# Patient Record
Sex: FEMALE | Race: WHITE | NOT HISPANIC OR LATINO | Employment: FULL TIME | URBAN - METROPOLITAN AREA
[De-identification: names, ages, dates, MRNs, and addresses within clinical notes are randomized per-mention and may not be internally consistent; named-entity substitution may affect disease eponyms.]

---

## 2017-01-09 ENCOUNTER — HOSPITAL ENCOUNTER (OUTPATIENT)
Dept: MRI IMAGING | Facility: CLINIC | Age: 47
Discharge: HOME | End: 2017-01-09
Payer: COMMERCIAL

## 2017-01-09 DIAGNOSIS — M19.90 OSTEOARTHRITIS, UNSPECIFIED OSTEOARTHRITIS TYPE, UNSPECIFIED SITE: ICD-10-CM

## 2017-01-09 PROCEDURE — 72141 MRI NECK SPINE W/O DYE: CPT

## 2017-08-11 ENCOUNTER — HEALTH MAINTENANCE LETTER (OUTPATIENT)
Age: 47
End: 2017-08-11

## 2017-08-14 ENCOUNTER — OFFICE VISIT (OUTPATIENT)
Dept: FAMILY MEDICINE | Facility: CLINIC | Age: 47
End: 2017-08-14

## 2017-08-14 VITALS
WEIGHT: 117.4 LBS | SYSTOLIC BLOOD PRESSURE: 124 MMHG | HEIGHT: 65 IN | BODY MASS INDEX: 19.56 KG/M2 | DIASTOLIC BLOOD PRESSURE: 86 MMHG | HEART RATE: 110 BPM | TEMPERATURE: 98.4 F

## 2017-08-14 DIAGNOSIS — F10.10 EXCESSIVE DRINKING ALCOHOL: ICD-10-CM

## 2017-08-14 DIAGNOSIS — M79.644 THUMB PAIN, RIGHT: Primary | ICD-10-CM

## 2017-08-14 DIAGNOSIS — Z79.1 LONG TERM (CURRENT) USE OF NON-STEROIDAL ANTI-INFLAMMATORIES (NSAID): ICD-10-CM

## 2017-08-14 DIAGNOSIS — Z87.891 PERSONAL HISTORY OF TOBACCO USE, PRESENTING HAZARDS TO HEALTH: ICD-10-CM

## 2017-08-14 DIAGNOSIS — R73.09 ELEVATED GLUCOSE: ICD-10-CM

## 2017-08-14 PROCEDURE — 80053 COMPREHEN METABOLIC PANEL: CPT | Mod: 90 | Performed by: PHYSICIAN ASSISTANT

## 2017-08-14 PROCEDURE — 73130 X-RAY EXAM OF HAND: CPT | Mod: RT | Performed by: PHYSICIAN ASSISTANT

## 2017-08-14 PROCEDURE — 36415 COLL VENOUS BLD VENIPUNCTURE: CPT | Performed by: PHYSICIAN ASSISTANT

## 2017-08-14 PROCEDURE — 83036 HEMOGLOBIN GLYCOSYLATED A1C: CPT | Performed by: PHYSICIAN ASSISTANT

## 2017-08-14 PROCEDURE — 99213 OFFICE O/P EST LOW 20 MIN: CPT | Performed by: PHYSICIAN ASSISTANT

## 2017-08-14 RX ORDER — ESCITALOPRAM OXALATE 5 MG/1
5 TABLET ORAL DAILY
COMMUNITY

## 2017-08-14 NOTE — LETTER
Susie Santana  [address] [facility] [address]        August 15, 2017          Dear ,    We are writing to inform you of your test results.    Your test results fall within the expected range(s) or remain unchanged from previous results.      Your glucose was slightly high so I did a test called an A1C to see what your average blood sugar for the last 3 months was at this was normal.   No evidence of diabetes.    The xray did show some arthritis.  Please try the Tylenol we discussed, see Dr. Rosen if your symptoms don't improve.    Resulted Orders   Comprehensive metabolic panel   Result Value Ref Range    Glucose 161 (H) 65 - 99 mg/dL      Comment:                    Fasting reference interval     For someone without known diabetes, a glucose  value >125 mg/dL indicates that they may have  diabetes and this should be confirmed with a  follow-up test.         Urea Nitrogen 13 7 - 25 mg/dL    Creatinine 0.78 0.50 - 1.10 mg/dL    GFR Estimate 90 > OR = 60 mL/min/1.73m2    EGFR African American 104 > OR = 60 mL/min/1.73m2    BUN/Creatinine Ratio NOT APPLICABLE 6 - 22 (calc)    Sodium 137 135 - 146 mmol/L    Potassium 4.9 3.5 - 5.3 mmol/L    Chloride 102 98 - 110 mmol/L    Carbon Dioxide 23 20 - 31 mmol/L    Calcium 10.2 8.6 - 10.2 mg/dL    Protein Total 7.4 6.1 - 8.1 g/dL    Albumin 5.0 3.6 - 5.1 g/dL    Globulin Calculated 2.4 1.9 - 3.7 g/dL (calc)    A/G Ratio 2.1 1.0 - 2.5 (calc)    Bilirubin Total 0.5 0.2 - 1.2 mg/dL    Alkaline Phosphatase 76 33 - 115 U/L    AST 23 10 - 35 U/L    ALT 22 6 - 29 U/L   Hemoglobin A1c (BFP)   Result Value Ref Range    Hemoglobin A1C 4.9 4.0 - 6.0 %     Xray report:    Req. Phys: Zoey Evans PA Clinic MRN:  Clinic: [facility] Acc#: [id number]  Exam: HAND 3 VIEWS / RT Exam Date: 08/14/2017  HAND 3 VIEWS RIGHT 8/14/2017 2:00 PM  HISTORY: Right thumb pain.  COMPARISON: None.  IMPRESSION: There is some minimal degenerative change in  the first metacarpophalangeal joint in the first  interphalangeal joint with a small stella of calcification or ossification involving the interphalangeal joint which is  probably due to some old trauma rather than acute fracture. No evidence for any definite acute fractures or any other  abnormalities.      If you have any questions or concerns, please call the clinic at the number listed above.       Sincerely,        WILLIAN Benavidez

## 2017-08-14 NOTE — NURSING NOTE
Susie is here for a consult for Arthritis.     Pre-Visit Screening :  Immunizations : up to date    Colonoscopy : NA  Mammogram : is up to date  Asthma Action Test/Plan : NA  PHQ9/GAD7 :  utd      Pulse - regular  My Chart - accepts    CLASSIFICATION OF OVERWEIGHT AND OBESITY BY BMI                         Obesity Class           BMI(kg/m2)  Underweight                                    < 18.5  Normal                                         18.5-24.9  Overweight                                     25.0-29.9  OBESITY                     I                  30.0-34.9                              II                 35.0-39.9  EXTREME OBESITY             III                >40                             Patient's  BMI Body mass index is 19.54 kg/(m^2).  http://hin.nhlbi.nih.gov/menuplanner/menu.cgi  Questioned patient about current smoking habits.  Pt. currently smokes.  Advised about smoking cessation.    LILLIE Sigala ([facility])

## 2017-08-14 NOTE — MR AVS SNAPSHOT
After Visit Summary   8/14/2017    Susie Santana    MRN: [id number]           Patient Information     Date Of Birth          1970        Visit Information        Provider Department      8/14/2017 1:00 PM Zoey Evans PA [facility]        Today's Diagnoses     Thumb pain, right    -  1    Excessive drinking alcohol        Long term (current) use of non-steroidal anti-inflammatories (nsaid)        Personal history of tobacco use, presenting hazards to health           Follow-ups after your visit        Additional Services     ORTHOPEDICS ADULT REFERRAL       Your provider has referred you to: [facility] [place] [phone]   [email or web address]    Please be aware that coverage of these services is subject to the terms and limitations of your health insurance plan.  Call member services at your health plan with any benefit or coverage questions.      Please bring the following to your appointment:    >>   Any x-rays, CTs or MRIs which have been performed.  Contact the facility where they were done to arrange for  prior to your scheduled appointment.  Any new CT, MRI or other procedures ordered by your specialist must be performed at a [facility] facility or coordinated by your clinic's referral office.    >>   List of current medications   >>   This referral request   >>   Any documents/labs given to you for this referral                  Who to contact     If you have questions or need follow up information about today's clinic visit or your schedule please contact [facility] directly at [phone].  Normal or non-critical lab and imaging results will be communicated to you by MyChart, letter or phone within 4 business days after the clinic has received the results. If you do not hear from us within 7 days, please contact the clinic through MyChart or phone. If you have a critical or  "abnormal lab result, we will notify you by phone as soon as possible.  Submit refill requests through OrderMotion or call your pharmacy and they will forward the refill request to us. Please allow 3 business days for your refill to be completed.          Additional Information About Your Visit        MyChart Information     OrderMotion lets you send messages to your doctor, view your test results, renew your prescriptions, schedule appointments and more. To sign up, go to [email or web address] . Click on \"Log in\" on the left side of the screen, which will take you to the Welcome page. Then click on \"Sign up Now\" on the right side of the page.     You will be asked to enter the access code listed below, as well as some personal information. Please follow the directions to create your username and password.     Your access code is: [email or web address]  Expires: 2017  1:42 PM     Your access code will  in 90 days. If you need help or a new code, please call your [facility] clinic or [phone].        Care EveryWhere ID     This is your Care EveryWhere ID. This could be used by other organizations to access your [facility] medical records  [id number]        Your Vitals Were     Pulse Temperature Height Breastfeeding? BMI (Body Mass Index)       110 98.4  F (36.9  C) (Oral) 1.651 m (5' 5\") No 19.54 kg/m2        Blood Pressure from Last 3 Encounters:   17 124/86   13 134/80   13 130/82    Weight from Last 3 Encounters:   17 53.3 kg (117 lb 6.4 oz)   13 54.4 kg (120 lb)   13 54.6 kg (120 lb 6.4 oz)              We Performed the Following     Comprehensive metabolic panel     ORTHOPEDICS ADULT REFERRAL     VENOUS COLLECTION     X-ray rt Hand G/E 3 vws*        Primary Care Provider Office Phone # Fax WILLIAN Persaud [phone] [address]  [facility] [address]        Equal Access to Services     MILES LUNSFORD AH: Christopher Dejesus, " wachristineda luroseanneadaha, qaybta kaamor flaherty, briseyda parkeraan ah. So [facility] [phone].    ATENCIÓN: Si dru nguyễn, tiene a galindo disposición servicios gratuitos de asistencia lingüística. Llame al [phone].    We comply with applicable federal civil rights laws and [place] laws. We do not discriminate on the basis of race, color, national origin, age, disability sex, sexual orientation or gender identity.            Thank you!     Thank you for choosing [facility]  for your care. Our goal is always to provide you with excellent care. Hearing back from our patients is one way we can continue to improve our services. Please take a few minutes to complete the written survey that you may receive in the mail after your visit with us. Thank you!             Your Updated Medication List - Protect others around you: Learn how to safely use, store and throw away your medicines at www.disposemymeds.org.          This list is accurate as of: 8/14/17  1:42 PM.  Always use your most recent med list.                   Brand Name Dispense Instructions for use Diagnosis    ALPRAZolam 0.25 MG tablet    XANAX    10 tablet    Take 1 tablet (0.25 mg) by mouth as needed for anxiety    Anxiety state, unspecified       LEXAPRO 5 MG tablet   Generic drug:  escitalopram      Take 5 mg by mouth daily        propranolol 60 MG 24 hr capsule    INDERAL LA    30 capsule    Take 1 capsule by mouth daily.    Anxiety state, unspecified

## 2017-08-14 NOTE — PROGRESS NOTES
SUBJECTIVE:                                                    Susie Santana is a 46 year old female who presents to clinic today for the following health issues:    Here with concerns about pain in her thumbs.  Pain increasing at MCP joint bilatearlly in the last year. Pt works as hair dresses. Has noticed that with picking up items, increasingly painful if using thumb, karla. When doing foiling of hair.      R= L    Pt is right handed.  Family hx: mother with knee replacement  Pt has bone spurs in her cervical spine.      OTC: Nothing    Smokin/2 ppd    Alcohol avg weekly 14 -  light beer.             Labs reviewed in EPIC  BP Readings from Last 3 Encounters:   17 124/86   13 134/80   13 130/82    Wt Readings from Last 3 Encounters:   17 53.3 kg (117 lb 6.4 oz)   13 54.4 kg (120 lb)   13 54.6 kg (120 lb 6.4 oz)                  Patient Active Problem List   Diagnosis     Allergic rhinitis     Family history of malignant neoplasm of gastrointestinal tract     Personal history of tobacco use, presenting hazards to health     Ganglion     Polyp of nasal cavity     Chondromalacia of patella     Health Care Home     Past Surgical History:   Procedure Laterality Date     C LIGATE FALLOPIAN TUBE,POSTPARTUM      Tubal Ligation     HC TOOTH EXTRACTION W/FORCEP         Social History   Substance Use Topics     Smoking status: Current Every Day Smoker     Packs/day: 0.50     Years: 14.00     Smokeless tobacco: Never Used     Alcohol use 8.0 oz/week     Family History   Problem Relation Age of Onset     Alzheimer Disease Paternal Grandmother      Alzheimer Disease Paternal Grandfather      Alzheimer Disease Maternal Grandmother      CANCER Father      colon     CANCER Paternal Grandmother      colon     DIABETES Maternal Grandfather      HEART DISEASE No family hx of      Breast Cancer No family hx of      Psychotic Disorder Sister      anxiety         Current Outpatient  "Prescriptions   Medication Sig Dispense Refill     escitalopram (LEXAPRO) 5 MG tablet Take 5 mg by mouth daily       ALPRAZolam (XANAX) 0.25 MG tablet Take 1 tablet (0.25 mg) by mouth as needed for anxiety 10 tablet 0     propranolol (INDERAL LA) 60 MG capsule Take 1 capsule by mouth daily. (Patient not taking: Reported on 8/14/2017) 30 capsule 1     Allergies   Allergen Reactions     Cephalosporins Hives     Dogs Itching     Latex      palpitations;     Sulfa Drugs Hives     Recent Labs   Lab Test  07/08/13   1447   TSH  2.09              OBJECTIVE:   /86 (BP Location: Left arm, Patient Position: Chair, Cuff Size: Adult Regular)  Pulse 110  Temp 98.4  F (36.9  C) (Oral)  Ht 1.651 m (5' 5\")  Wt 53.3 kg (117 lb 6.4 oz)  Breastfeeding? No  BMI 19.54 kg/m2   Body mass index is 19.54 kg/(m^2).       Bilateral  Inspection: Heberden nodules on 2nd and 3rd fingers  Tender: Thumb:   MCP joint  Non-tender: Carpals:  scaphoid, lunate, capitate, trapezium, trapezoid, hamate, triquetrum, Metacarpals:  1st metacarpal, 2nd metacarpal, 3rd metacarpal, 4th metacarpal, 5th metacarpal  Range of Motion All Normal  Strength: full strength  Special tests: grind test of both thumbs + for pain at MCP joint    Xray - very minimal arthritis            ASSESSMENT/PLAN:                                                        ICD-10-CM    1. Thumb pain, right M79.644 X-ray rt Hand G/E 3 vws*   2. Excessive drinking alcohol F10.10 VENOUS COLLECTION     Comprehensive metabolic panel     ORTHOPEDICS ADULT REFERRAL     CANCELED: HEPATIC PANEL ([facility])   3. Long term (current) use of non-steroidal anti-inflammatories (nsaid) Z79.1 Comprehensive metabolic panel     ORTHOPEDICS ADULT REFERRAL   4. Personal history of tobacco use, presenting hazards to health Z87.891        Start APAP 1000 mg tid   Liver and Cr. Pending  Stop smoking  Ortho if thumb pain not improving.   Limit alcohol intake      [facility]

## 2017-08-15 LAB
ALBUMIN SERPL-MCNC: 5 G/DL (ref 3.6–5.1)
ALBUMIN/GLOB SERPL: 2.1 (CALC) (ref 1–2.5)
ALP SERPL-CCNC: 76 U/L (ref 33–115)
ALT SERPL-CCNC: 22 U/L (ref 6–29)
AST SERPL-CCNC: 23 U/L (ref 10–35)
BILIRUB SERPL-MCNC: 0.5 MG/DL (ref 0.2–1.2)
BUN SERPL-MCNC: 13 MG/DL (ref 7–25)
BUN/CREATININE RATIO: ABNORMAL (CALC) (ref 6–22)
CALCIUM SERPL-MCNC: 10.2 MG/DL (ref 8.6–10.2)
CHLORIDE SERPLBLD-SCNC: 102 MMOL/L (ref 98–110)
CO2 SERPL-SCNC: 23 MMOL/L (ref 20–31)
CREAT SERPL-MCNC: 0.78 MG/DL (ref 0.5–1.1)
EGFR AFRICAN AMERICAN - QUEST: 104 ML/MIN/1.73M2
GFR SERPL CREATININE-BSD FRML MDRD: 90 ML/MIN/1.73M2
GLOBULIN, CALCULATED - QUEST: 2.4 G/DL (CALC) (ref 1.9–3.7)
GLUCOSE - QUEST: 161 MG/DL (ref 65–99)
HBA1C MFR BLD: 4.9 % (ref 4–6)
POTASSIUM SERPL-SCNC: 4.9 MMOL/L (ref 3.5–5.3)
PROT SERPL-MCNC: 7.4 G/DL (ref 6.1–8.1)
SODIUM SERPL-SCNC: 137 MMOL/L (ref 135–146)

## 2017-10-26 ENCOUNTER — HOSPITAL ENCOUNTER (OUTPATIENT)
Dept: MAMMOGRAPHY | Facility: CLINIC | Age: 47
Discharge: HOME | End: 2017-10-26
Attending: OBSTETRICS & GYNECOLOGY | Admitting: OBSTETRICS & GYNECOLOGY
Payer: COMMERCIAL

## 2017-10-26 DIAGNOSIS — Z12.31 VISIT FOR SCREENING MAMMOGRAM: ICD-10-CM

## 2017-10-26 PROCEDURE — 77063 BREAST TOMOSYNTHESIS BI: CPT

## 2017-10-26 PROCEDURE — G0202 SCR MAMMO BI INCL CAD: HCPCS

## 2017-12-21 ENCOUNTER — OFFICE VISIT (OUTPATIENT)
Dept: FAMILY MEDICINE | Facility: CLINIC | Age: 47
End: 2017-12-21

## 2017-12-21 VITALS
SYSTOLIC BLOOD PRESSURE: 124 MMHG | OXYGEN SATURATION: 98 % | HEART RATE: 94 BPM | BODY MASS INDEX: 19.86 KG/M2 | TEMPERATURE: 98.5 F | HEIGHT: 65 IN | WEIGHT: 119.2 LBS | DIASTOLIC BLOOD PRESSURE: 84 MMHG

## 2017-12-21 DIAGNOSIS — Z23 NEED FOR VACCINATION: ICD-10-CM

## 2017-12-21 DIAGNOSIS — Z71.89 ACP (ADVANCE CARE PLANNING): ICD-10-CM

## 2017-12-21 DIAGNOSIS — Z87.891 PERSONAL HISTORY OF TOBACCO USE, PRESENTING HAZARDS TO HEALTH: ICD-10-CM

## 2017-12-21 DIAGNOSIS — L08.9 SKIN INFECTION: Primary | ICD-10-CM

## 2017-12-21 LAB
% GRANULOCYTES: 64.5 %
HCT VFR BLD AUTO: 45 % (ref 35–47)
HEMOGLOBIN: 14.4 G/DL (ref 11.7–15.7)
LYMPHOCYTES NFR BLD AUTO: 26.9 %
MCH RBC QN AUTO: 33.3 PG (ref 26–33)
MCHC RBC AUTO-ENTMCNC: 32 G/DL (ref 31–36)
MCV RBC AUTO: 104.2 FL (ref 78–100)
MONOCYTES NFR BLD AUTO: 8.6 %
PLATELET COUNT - QUEST: 362 10^9/L (ref 150–375)
RBC # BLD AUTO: 4.32 10*12/L (ref 3.8–5.2)
WBC # BLD AUTO: 9.1 10*9/L (ref 4–11)

## 2017-12-21 PROCEDURE — 90686 IIV4 VACC NO PRSV 0.5 ML IM: CPT | Performed by: PHYSICIAN ASSISTANT

## 2017-12-21 PROCEDURE — 90471 IMMUNIZATION ADMIN: CPT | Performed by: PHYSICIAN ASSISTANT

## 2017-12-21 PROCEDURE — 85025 COMPLETE CBC W/AUTO DIFF WBC: CPT | Performed by: PHYSICIAN ASSISTANT

## 2017-12-21 PROCEDURE — 99213 OFFICE O/P EST LOW 20 MIN: CPT | Mod: 25 | Performed by: PHYSICIAN ASSISTANT

## 2017-12-21 PROCEDURE — 36415 COLL VENOUS BLD VENIPUNCTURE: CPT | Performed by: PHYSICIAN ASSISTANT

## 2017-12-21 RX ORDER — DOXYCYCLINE 100 MG/1
100 CAPSULE ORAL 2 TIMES DAILY
Qty: 20 CAPSULE | Refills: 0 | Status: SHIPPED | OUTPATIENT
Start: 2017-12-21 | End: 2020-07-20

## 2017-12-21 NOTE — PROGRESS NOTES
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   Pain/swelling continues in finger.  Pt is right handed  Denies fevers, trauma, flu-like sx    Is a smoker, not ready to quit            BP Readings from Last 3 Encounters:   12/21/17 124/84   08/14/17 124/86   09/23/13 134/80    Wt Readings from Last 3 Encounters:   12/21/17 54.1 kg (119 lb 3.2 oz)   08/14/17 53.3 kg (117 lb 6.4 oz)   09/23/13 54.4 kg (120 lb)            Patient Active Problem List   Diagnosis     Allergic rhinitis     Family history of malignant neoplasm of gastrointestinal tract     Personal history of tobacco use, presenting hazards to health     Ganglion     Polyp of nasal cavity     Chondromalacia of patella     Health Care Home     ACP (advance care planning)     Past Surgical History:   Procedure Laterality Date     C LIGATE FALLOPIAN TUBE,POSTPARTUM  2005    Tubal Ligation     HC TOOTH EXTRACTION W/FORCEP         Social History   Substance Use Topics     Smoking status: Current Every Day Smoker     Packs/day: 0.50     Years: 14.00     Smokeless tobacco: Never Used     Alcohol use 8.0 oz/week     Family History   Problem Relation Age of Onset     Alzheimer Disease Paternal Grandmother      Alzheimer Disease Paternal Grandfather      Alzheimer Disease Maternal Grandmother      CANCER Father      colon     CANCER Paternal Grandmother      colon     DIABETES Maternal Grandfather      HEART DISEASE No family hx of      Breast Cancer No family hx of      Psychotic Disorder Sister      anxiety         Current Outpatient Prescriptions   Medication Sig Dispense Refill     escitalopram (LEXAPRO) 5 MG tablet Take 5 mg by mouth daily       ALPRAZolam (XANAX) 0.25 MG tablet Take 1 tablet (0.25 mg) by mouth  "as needed for anxiety 10 tablet 0       Allergies   Allergen Reactions     Cephalosporins Hives     Dogs Itching     Latex      palpitations;     Sulfa Drugs Hives       OBJECTIVE:                                                    /84 (BP Location: Left arm, Patient Position: Chair, Cuff Size: Adult Regular)  Pulse 94  Temp 98.5  F (36.9  C) (Oral)  Ht 1.651 m (5' 5\")  Wt 54.1 kg (119 lb 3.2 oz)  LMP  (LMP Unknown)  SpO2 98%  Breastfeeding? No  BMI 19.84 kg/m2 Body mass index is 19.84 kg/(m^2).     GENERAL: alert and no distress  Right thumb: distal  thumb moderately swollen, warm, no obvious source of opening to the skin. Mild decrease in flexion of thumb    Labs Resulted Today:   Results for orders placed or performed in visit on 12/21/17   CL AFF HEMOGRAM/PLATE/DIFF (BFP)   Result Value Ref Range    WBC 9.1 4.0 - 11 10*9/L    RBC Count 4.32 3.8 - 5.2 10*12/L    Hemoglobin 14.4 11.7 - 15.7 g/dL    Hematocrit 45.0 35.0 - 47.0 %    .2 (A) 78 - 100 fL    MCH 33.3 (A) 26 - 33 pg    MCHC 32.0 31 - 36 g/dL    Platelet Count 362 150 - 375 10^9/L    % Granulocytes 64.5 %    % Lymphocytes 26.9 %    % Monocytes 8.6 %            ASSESSMENT/PLAN:                                                      1. ACP (advance care planning)      2. Need for vaccination    - HC FLU VAC PRESRV FREE QUAD SPLIT VIR 3+YRS IM  - VACCINE ADMINISTRATION, INITIAL    3. Skin infection  FU with Dr. Bustamante 2 days recheck  RTC tomorrow or UC if worsening  - CL AFF HEMOGRAM/PLATE/DIFF (BFP)  - VENOUS COLLECTION  - doxycycline (VIBRAMYCIN) 100 MG capsule; Take 1 capsule (100 mg) by mouth 2 times daily  Dispense: 20 capsule; Refill: 0    4. Personal history of tobacco use, presenting hazards to health  Not ready to quit        [facility]    "

## 2017-12-21 NOTE — NURSING NOTE
"Susie Santana is here today for an infection in her right thumb.    Pre-visit Planning:    Immunizations -up to date - Flu Shot Today  Colonoscopy -NA  Mammogram -is up to date  Asthma test --NA  PHQ9 -up to date  DAVID 7 -is up to date      Vitals:    BP Cuff left  Arm with regular Cuff  PULSE regular  119 lbs 3.2 oz and 5' 5\"  CLASSIFICATION OF OVERWEIGHT AND OBESITY BY BMI                        Obesity Class           BMI(kg/m2)  Underweight                                    < 18.5  Normal                                         18.5-24.9  Overweight                                     25.0-29.9  OBESITY                     I                  30.0-34.9                             II                 35.0-39.9  EXTREME OBESITY             III                >40      Patient's  BMI Body mass index is 19.84 kg/(m^2).  Http://hin.nhlbi.nih.gov/menuplanner/menu.cgi    My Chart: - declines     Tobacco Use: Questioned patient about current smoking habits.  Pt. currently smokes.  Advised about smoking cessation.    The patient has verbalized that it is ok to leave a detailed voice message on the patient's cell phone with results/recommendations from this visit.     Verified Phone Number Today: [phone]    Monet Sanchez [facility]    "

## 2017-12-21 NOTE — MR AVS SNAPSHOT
"              After Visit Summary   12/21/2017    Susie Santana    MRN: [id number]           Patient Information     Date Of Birth          1970        Visit Information        Provider Department      12/21/2017 3:30 PM Zoey Evans PA [facility]        Today's Diagnoses     Skin infection    -  1    ACP (advance care planning)        Need for vaccination        Personal history of tobacco use, presenting hazards to health           Follow-ups after your visit        Who to contact     If you have questions or need follow up information about today's clinic visit or your schedule please contact [facility] directly at [phone].  Normal or non-critical lab and imaging results will be communicated to you by MyChart, letter or phone within 4 business days after the clinic has received the results. If you do not hear from us within 7 days, please contact the clinic through MyChart or phone. If you have a critical or abnormal lab result, we will notify you by phone as soon as possible.  Submit refill requests through [employer] or call your pharmacy and they will forward the refill request to us. Please allow 3 business days for your refill to be completed.          Additional Information About Your Visit        Care EveryWhere ID     This is your Care EveryWhere ID. This could be used by other organizations to access your [facility] medical records  [id number]        Your Vitals Were     Pulse Temperature Height Last Period Pulse Oximetry Breastfeeding?    94 98.5  F (36.9  C) (Oral) 1.651 m (5' 5\") (LMP Unknown) 98% No    BMI (Body Mass Index)                   19.84 kg/m2            Blood Pressure from Last 3 Encounters:   12/21/17 124/84   08/14/17 124/86   09/23/13 134/80    Weight from Last 3 Encounters:   12/21/17 54.1 kg (119 lb 3.2 oz)   08/14/17 53.3 kg (117 lb 6.4 oz)   09/23/13 54.4 kg (120 lb)              We Performed the Following     " CL AFF HEMOGRAM/PLATE/DIFF (BFP)     HC FLU VAC PRESRV FREE QUAD SPLIT VIR 3+YRS IM     VACCINE ADMINISTRATION, INITIAL     VENOUS COLLECTION          Today's Medication Changes          These changes are accurate as of: 12/21/17 11:59 PM.  If you have any questions, ask your nurse or doctor.               Start taking these medicines.        Dose/Directions    doxycycline 100 MG capsule   Commonly known as:  VIBRAMYCIN   Used for:  Skin infection   Started by:  Zoey Evans PA        Dose:  100 mg   Take 1 capsule (100 mg) by mouth 2 times daily   Quantity:  20 capsule   Refills:  0            Where to get your medicines      [facility] [address] [place] [address]  [id number] [place] [id number]    Hours:  24-hours Phone:  [phone]     doxycycline 100 MG capsule                Primary Care Provider Office Phone # Fax #    WILLIAN Benavidez [phone] [address]  [facility] [address]        Equal Access to Services     [facility]: Hadii aad ku hadasho Soomaali, waaxda luqadaha, qaybta kaalmada adeegyada, waxay christi qureshi . So [facility] [phone].    ATENCIÓN: Si habla español, tiene a galindo disposición servicios gratuitos de asistencia lingüística. Llame al [phone].    We comply with applicable federal civil rights laws and [place] laws. We do not discriminate on the basis of race, color, national origin, age, disability, sex, sexual orientation, or gender identity.            Thank you!     Thank you for choosing [facility]  for your care. Our goal is always to provide you with excellent care. Hearing back from our patients is one way we can continue to improve our services. Please take a few minutes to complete the written survey that you may receive in the mail after your visit with us. Thank you!              Your Updated Medication List - Protect others around you: Learn how to safely use, store and throw away your medicines at www.disposemymeds.org.          This list is accurate as of: 12/21/17 11:59 PM.  Always use your most recent med list.                   Brand Name Dispense Instructions for use Diagnosis    ALPRAZolam 0.25 MG tablet    XANAX    10 tablet    Take 1 tablet (0.25 mg) by mouth as needed for anxiety    Anxiety state, unspecified       doxycycline 100 MG capsule    VIBRAMYCIN    20 capsule    Take 1 capsule (100 mg) by mouth 2 times daily    Skin infection       LEXAPRO 5 MG tablet   Generic drug:  escitalopram      Take 5 mg by mouth daily

## 2019-03-04 ENCOUNTER — HOSPITAL ENCOUNTER (OUTPATIENT)
Dept: MAMMOGRAPHY | Facility: CLINIC | Age: 49
Discharge: HOME | End: 2019-03-04
Attending: PHYSICIAN ASSISTANT | Admitting: PHYSICIAN ASSISTANT
Payer: COMMERCIAL

## 2019-03-04 DIAGNOSIS — Z12.31 VISIT FOR SCREENING MAMMOGRAM: ICD-10-CM

## 2019-03-04 PROCEDURE — 77063 BREAST TOMOSYNTHESIS BI: CPT

## 2019-10-03 ENCOUNTER — ALLIED HEALTH/NURSE VISIT (OUTPATIENT)
Dept: FAMILY MEDICINE | Facility: CLINIC | Age: 49
End: 2019-10-03

## 2019-10-03 DIAGNOSIS — Z23 NEED FOR VACCINATION: Primary | ICD-10-CM

## 2019-10-03 PROCEDURE — 90686 IIV4 VACC NO PRSV 0.5 ML IM: CPT | Performed by: PHYSICIAN ASSISTANT

## 2019-10-03 PROCEDURE — 90471 IMMUNIZATION ADMIN: CPT | Performed by: PHYSICIAN ASSISTANT

## 2020-03-16 ENCOUNTER — VIRTUAL VISIT (OUTPATIENT)
Dept: FAMILY MEDICINE | Facility: OTHER | Age: 50
End: 2020-03-16

## 2020-03-16 NOTE — PROGRESS NOTES
"Date: 2020 11:05:32  Clinician: Isrrael Rowell  Clinician NPI: [id number]  Patient: Susie Santana  Patient : 1970  Patient Address: [address] [place] [id number]  Patient Phone: [phone]  Visit Protocol: URI  Patient Summary:  Susie is a 49 year old ( : 1970 ) female who initiated a Visit for COVID-19 (Coronavirus) evaluation and screening. When asked the question \"Please sign me up to receive news, health information and promotions. \", Susie responded \"No\".    Susie states her symptoms started 1-2 days ago.   Her symptoms consist of malaise, rhinitis, chills, a sore throat, and a cough.   Symptom details     Nasal secretions: The color of her mucus is clear.    Cough: Susie coughs a few times an hour and her cough is not more bothersome at night. Phlegm comes into her throat when she coughs. She believes her cough is caused by post-nasal drip. The color of the phlegm is clear.     Sore throat: Susie reports having mild throat pain (1-3 on a 10 point pain scale), does not have exudate on her tonsils, and can swallow liquids. She is not sure if the lymph nodes in her neck are enlarged. A rash has not appeared on the skin since the sore throat started.      Susie denies having ear pain, headache, facial pain or pressure, myalgias, fever, wheezing, nasal congestion, and teeth pain. She also denies having recent facial or sinus surgery in the past 60 days and taking antibiotic medication for the symptoms. She is not experiencing dyspnea.   Precipitating events  Within the past week, Susie has not been exposed to someone with strep throat. She has not recently been exposed to someone with influenza. Susie has been in close contact with the following high risk individuals: people with asthma, heart disease or diabetes and adults 65 or older.   Pertinent COVID-19 (Coronavirus) information  Susie has not traveled internationally or to the areas where COVID-19 (Coronavirus) is " widespread in the last 14 days before the start of her symptoms.   Susie has not had close contact with a suspected or laboratory-confirmed COVID-19 patient within 14 days of symptom onset.   Susie is not a healthcare worker and does not work in a healthcare facility.   Pertinent medical history  Susie typically gets a yeast infection when she takes antibiotics. She has used fluconazole (Diflucan) to treat previous yeast infections. 1 dose of fluconazole (Diflucan) has typically been sufficient for symptoms to resolve in the past.   Susie does not need a return to work/school note.   Weight: 120 lbs   Susie smokes or uses smokeless tobacco.   Additional information as reported by the patient (free text): I had a low grade fever on Saturday and Sunday, today the fever is gone   Weight: 120 lbs    MEDICATIONS: Flonase Allergy Relief nasal, ALLERGIES: Ceftin, Sulfa (Sulfonamide Antibiotics), Latex, Natural Rubber  Clinician Response:  Dear Susie,  Based on the information provided, you have a viral upper respiratory infection, otherwise known as a cold. Symptoms vary from person to person, but can include sneezing, coughing, a runny nose, sore throat, and headache and range from mild to severe.  Unfortunately, there are no medications that can cure a cold, so treatment is focused on controlling symptoms as much as possible. Most people gradually feel better until symptoms are gone in 1-2 weeks.  Medication information  Because you have a viral infection, antibiotics will not help you get better. Treating a viral infection with antibiotics could actually make you feel worse.  Self care  The following tips will keep you as comfortable as possible while you recover:     Rest    Drink plenty of water and other liquids    Take a hot shower to loosen congestion    Use throat lozenges    Gargle with warm salt water (1/4 teaspoon of salt per 8 ounce glass of water)    Suck on frozen items such as popsicles or ice cubes     Drink hot tea with lemon and honey    Take a spoonful of honey to reduce your cough     Also, as your provider, I need you to know that becoming tobacco-free is the most important thing you can do to protect your current and future health.  When to seek care  Please be seen in a clinic or urgent care if new symptoms develop, or symptoms become worse.  Call 911 or go to the emergency room if you feel that your throat is closing off, you suddenly develop a rash, you are unable to swallow fluids, you are drooling, or you are having difficulty breathing.  COVID-19 (Coronavirus) General Information  With the increase in the number of COVID-19 (Coronavirus) cases, we understand you may have some questions. Below is some helpful information on COVID-19 (Coronavirus).  How can I protect myself and others from the COVID-19 (Coronavirus)?  Because there is currently no vaccine to prevent infection, the best way to protect yourself is to avoid being exposed to this virus. Put distance between yourself and other people if COVID-19 (Coronavirus) is spreading in your community. The virus is thought to spread mainly from person-to-person.     Between people who are in close contact with one another (within about 6 about) for prolonged period (10 minutes or longer).    Through respiratory droplets produced when an infected person coughs or sneezes.     The CDC recommends the following additional steps to protect yourself and others:     Wash your hands often with soap and water for at least 20 seconds, especially after blowing your nose, coughing, or sneezing; going to the bathroom; and before eating or preparing food.  Use an alcohol-based hand  that contains at least 60 percent alcohol if soap and water are not available.        Avoid touching your eyes, nose and mouth with unwashed hands.    Avoid close contact with people who are sick.    Stay home when you are sick.    Cover your cough or sneeze with a tissue, then  throw the tissue in the trash.    Clean and disinfect frequently touched objects and surfaces.     You can help stop COVID-19 (Coronavirus) by knowing the signs and symptoms:     Fever    Cough    Shortness of breath     Contact your healthcare provider if   Develop symptoms   AND   Have been in close contact with a person known to have COVID-19 (Coronavirus) or live in or have recently traveled from an area with ongoing spread of COVID-19 (Coronavirus). Call ahead before you go to a doctor's office or emergency room. Tell them about your recent travel and your symptoms.   For the most up to date information, visit the CDC's website.  Steps to help prevent the spread of COVID-19 (Coronavirus) if you are sick  If you are sick with COVID-19 (Coronavirus) or suspect you are infected with the virus that causes COVID-19 (Coronavirus), follow the steps below to help prevent the disease from spreading&nbsp;to people in your home and community.     Stay home except to get medical care. Home isolation may be started in consultation with your healthcare clinician.    Separate yourself from other people and animals in your home.    Call ahead before visiting your doctor if you have a medical appointment.    Wear a facemask when you are around other people.    Cover your cough and sneezes.    Clean your hands often.    Avoid sharing personal household items.    Clean and disinfect frequently touched objects and surfaces everyday.    You will need to have someone drop off medications or household supplies (if needed) at your house without coming inside or in contact with you or others living in your house.    Monitor your symptoms and seek prompt medical care if your illness is worsening (e.g. Difficulty breathing).    Discontinue home isolation only in consultation with your healthcare provider.     For more detailed and up to date information on what to do if you are sick, visit this link: What to Do If You Are Sick With  "Coronavirus Disease 2019 (COVID-19).  Do I need to be tested for COVID-19 (Coronavirus)?     At this time, the limited number of tests available are controlled by the state and local health departments and are being reserved for more seriously ill patients, those with known exposure to confirmed patients, and those with recent travel (within 14 days) to countries with high rates of COVID-19 (Coronavirus).    Decisions on which patients receive testing will be based on the local spread of COVID-19 (Coronavirus) as well as the symptoms. Your healthcare provider will make the final decision on whether you should be tested.    In the meantime, if you have concerns that you may have been exposed, it is reasonable to practice \"social distancing.\"&nbsp; If you are ill with a cold or flu-like illness, please monitor your symptoms and reach out to your healthcare provider if your symptoms worsen.    For more up to date information, visit this link: COVID-19 (Coronavirus) Frequently Asked Questions and Answers.      Diagnosis: Viral URI  Diagnosis ICD: J06.9  "

## 2020-06-25 ENCOUNTER — HOSPITAL ENCOUNTER (OUTPATIENT)
Dept: MAMMOGRAPHY | Facility: CLINIC | Age: 50
Discharge: HOME | End: 2020-06-25
Attending: OBSTETRICS & GYNECOLOGY | Admitting: OBSTETRICS & GYNECOLOGY
Payer: COMMERCIAL

## 2020-06-25 DIAGNOSIS — Z12.31 VISIT FOR SCREENING MAMMOGRAM: ICD-10-CM

## 2020-06-25 PROCEDURE — 77063 BREAST TOMOSYNTHESIS BI: CPT

## 2020-07-17 ENCOUNTER — TELEPHONE (OUTPATIENT)
Dept: FAMILY MEDICINE | Facility: CLINIC | Age: 50
End: 2020-07-17

## 2020-07-17 NOTE — TELEPHONE ENCOUNTER
Pt called in stating since using a mask she has had a lot of mouth problems. She feels like her mouth is raw and peeling. She stated she can only eat bland foods. She tried gargling with salt water with no relief. She is wondering if there is anything else she can try.    Please advise or I can call pt to schedule appt (we have not seen her in a few years)    Thanks, Génesis

## 2020-07-20 ENCOUNTER — OFFICE VISIT (OUTPATIENT)
Dept: FAMILY MEDICINE | Facility: CLINIC | Age: 50
End: 2020-07-20

## 2020-07-20 VITALS
WEIGHT: 116.4 LBS | HEART RATE: 84 BPM | TEMPERATURE: 97.5 F | BODY MASS INDEX: 19.37 KG/M2 | RESPIRATION RATE: 20 BRPM | DIASTOLIC BLOOD PRESSURE: 82 MMHG | SYSTOLIC BLOOD PRESSURE: 126 MMHG

## 2020-07-20 DIAGNOSIS — K13.79 MOUTH SORE: Primary | ICD-10-CM

## 2020-07-20 PROCEDURE — 36415 COLL VENOUS BLD VENIPUNCTURE: CPT | Performed by: FAMILY MEDICINE

## 2020-07-20 PROCEDURE — 99213 OFFICE O/P EST LOW 20 MIN: CPT | Performed by: FAMILY MEDICINE

## 2020-07-20 PROCEDURE — 82607 VITAMIN B-12: CPT | Mod: 90 | Performed by: FAMILY MEDICINE

## 2020-07-20 PROCEDURE — 82746 ASSAY OF FOLIC ACID SERUM: CPT | Mod: 90 | Performed by: FAMILY MEDICINE

## 2020-07-20 RX ORDER — NYSTATIN 100000/ML
500000 SUSPENSION, ORAL (FINAL DOSE FORM) ORAL 4 TIMES DAILY
Qty: 400 ML | Refills: 0 | Status: SHIPPED | OUTPATIENT
Start: 2020-07-20 | End: 2020-08-13

## 2020-07-20 RX ORDER — ALPRAZOLAM 0.25 MG
0.25 TABLET ORAL PRN
COMMUNITY
Start: 2020-05-27

## 2020-07-20 NOTE — PROGRESS NOTES
SUBJECTIVE:  Susie Santana, a 49 year old female scheduled an appointment to discuss the following issues:  Mouth sore  Pt with one month of a sensation of burning pain in mouth- makes it hard to eat as this worsens symptoms . No sores or discharge.  She does feel the skin peels easily from inside of cheeks. No taste problems. No swallowing difficulty.  No supplement use.  Pt states anxiety  high of late      Medical, social, surgical, and family histories reviewed.    Patient Active Problem List   Diagnosis     Allergic rhinitis     Family history of malignant neoplasm of gastrointestinal tract     Personal history of tobacco use, presenting hazards to health     Ganglion     Polyp of nasal cavity     Chondromalacia of patella     Health Care Home     ACP (advance care planning)     Past Medical History:   Diagnosis Date     Family history of malignant neoplasm of gastrointestinal tract      Family History   Problem Relation Age of Onset     Alzheimer Disease Paternal Grandmother      Alzheimer Disease Paternal Grandfather      Alzheimer Disease Maternal Grandmother      Cancer Father         colon     Cancer Paternal Grandmother         colon     Diabetes Maternal Grandfather      Heart Disease No family hx of      Breast Cancer No family hx of      Psychotic Disorder Sister         anxiety     Social History     Socioeconomic History     Marital status:      Spouse name: Laz     Number of children: 0     Years of education: 13     Highest education level: Not on file   Occupational History     Occupation: Cosmatologist     Comment: Self     [employer]   Social Needs     Financial resource strain: Not on file     Food insecurity     Worry: Not on file     Inability: Not on file     Transportation needs     Medical: Not on file     Non-medical: Not on file   Tobacco Use     Smoking status: Current Every Day Smoker     Packs/day: 0.50     Years: 14.00     Pack years: 7.00     Smokeless tobacco:  Never Used   Substance and Sexual Activity     Alcohol use: Yes     Alcohol/week: 13.3 standard drinks     Drug use: No     Sexual activity: Yes     Partners: Male     Birth control/protection: Surgical     Comment: tubal ligation   Lifestyle     Physical activity     Days per week: Not on file     Minutes per session: Not on file     Stress: Not on file   Relationships     Social connections     Talks on phone: Not on file     Gets together: Not on file     Attends Confucianist service: Not on file     Active member of club or organization: Not on file     Attends meetings of clubs or organizations: Not on file     Relationship status: Not on file     Intimate partner violence     Fear of current or ex partner: Not on file     Emotionally abused: Not on file     Physically abused: Not on file     Forced sexual activity: Not on file   Other Topics Concern      Service Not Asked     Blood Transfusions Not Asked     Caffeine Concern Not Asked     Occupational Exposure Not Asked     Hobby Hazards Not Asked     Sleep Concern Not Asked     Stress Concern Not Asked     Weight Concern Not Asked     Special Diet Not Asked     Back Care Not Asked     Exercise Yes     Bike Helmet Not Asked     Seat Belt Yes     Self-Exams Yes     Parent/sibling w/ CABG, MI or angioplasty before 65F 55M? Not Asked   Social History Narrative        Functional abiltity:      Hearing imparment:No      Acitvities of daily living:Normal      Risk of falls:No      Home safety of concern:No        Do you exercise?     Yes   Times/week: 4    History of abusive relationships in past:   No    History of abusive relationships currently:    No    Do you feel emotionally and physically safe in your environment?     Yes    Do you own a gun?  No      Is the gun kept in a safe place:   NOT APPLICABLE    Do you wear a seatbelt regularly?     Yes    Do you use sun screen?     Yes         Past Surgical History:   Procedure Laterality Date     C LIGATE  FALLOPIAN TUBE,POSTPARTUM  2005    Tubal Ligation     HC TOOTH EXTRACTION W/FORCEP       ALPRAZolam (XANAX) 0.25 MG tablet, Take 0.25 mg by mouth as needed  escitalopram (LEXAPRO) 5 MG tablet, Take 5 mg by mouth daily    No current facility-administered medications on file prior to visit.        Allergies: Cephalosporins; Dogs; Latex; and Sulfa drugs    Immunization History   Administered Date(s) Administered     HepB 05/16/2000, 06/26/2000, 11/09/2000     Influenza (IIV3) PF 10/14/1999, 12/14/2000, 10/29/2001, 11/14/2002, 10/08/2003, 11/30/2006, 10/02/2008, 09/17/2009, 09/21/2010, 10/03/2011     Influenza Vaccine IM > 6 months Valent IIV4 09/23/2013, 11/04/2014, 10/12/2015, 12/21/2017, 10/03/2019     TD (ADULT, 7+) 08/15/2006     TDAP Vaccine (Boostrix) 10/03/2011        ROS:  CONSTITUTIONAL: NEGATIVE for fever, chills  RESP: NEGATIVE for significant cough or SOB  CV: NEGATIVE for chest pain, palpitations     OBJECTIVE:  /82 (BP Location: Right arm, Patient Position: Chair, Cuff Size: Adult Regular)   Pulse 84   Temp 97.5  F (36.4  C)   Resp 20   Wt 52.8 kg (116 lb 6.4 oz)   BMI 19.37 kg/m    EXAM:  GENERAL APPEARANCE: healthy, alert and no distress  EYES: EOMI,  PERRL  HENT: ear canals and TM's normal and nose and mouth without ulcers or lesions-slight erythema diffusely but no white plaque, no ulcers  RESP: lungs clear to auscultation - no rales, rhonchi or wheezes  CV: regular rates and rhythm, normal S1 S2, no S3 or S4 and no murmur, click or rub -  ABDOMEN:  soft, nontender, no HSM or masses and bowel sounds normal    ASSESSMENT/PLAN:  (K13.79) Mouth sore  (primary encounter diagnosis)  Comment: possible mild candida but not clear-, may be burnign mouth syndrome  Plan: VITAMIN B12 ([facility]), FOLATE ([facility]), VENOUS         COLLECTION, nystatin (MYCOSTATIN) [id number]         UNIT/ML suspension        We agree to check B12 and folate, try nystatin, if not better then likely burning mouth and may need  gabapentin    f/u if not improving or worsening by MC

## 2020-07-20 NOTE — NURSING NOTE
Susie Santana is here for possible thrush. Mouth was peeling for the past few weeks. Now has spots on tongue and sensitive.

## 2020-07-20 NOTE — LETTER
July 21, 2020      Susie Santana  [address] [facility] [address]        Dear ,    We are writing to inform you of your test results.    Your test results fall within the expected range(s) or remain unchanged from previous results.  Please continue with current treatment plan. Let me know if the mouth issues persist    Resulted Orders   VITAMIN B12 ([facility])   Result Value Ref Range    Vitamin B12 411 200 - 1,100 pg/mL   FOLATE ([facility])   Result Value Ref Range    Folate 22.2 ng/mL      Comment:                                 Reference Range                             Low:           <3.4                             Borderline:    3.4-5.4                             Normal:        >5.4            If you have any questions or concerns, please call the clinic at the number listed above.       Sincerely,        Sudarshan Bustamante MD

## 2020-07-21 ENCOUNTER — TELEPHONE (OUTPATIENT)
Dept: FAMILY MEDICINE | Facility: CLINIC | Age: 50
End: 2020-07-21

## 2020-07-21 LAB
FOLATE: 22.2 NG/ML
VIT B12 SERPL-MCNC: 411 PG/ML (ref 200–1100)

## 2020-07-21 NOTE — TELEPHONE ENCOUNTER
Susie called with questions on her mouth rinse.  You told her to spit after she swishes and the pharmacist and the directions on the bottle say to swallow.  Please confirm if she should spit or swallow?    Patients phone #[phone]

## 2020-07-21 NOTE — TELEPHONE ENCOUNTER
Spoke to Susie to give her the info per [facility]'s note below.  She also wanted to know what to do about the cracks in the corner of her lips so I told her to try Vaseline especially at night.  She will try that and if it didn't work, she will give a call back.

## 2020-07-27 ENCOUNTER — TELEPHONE (OUTPATIENT)
Dept: FAMILY MEDICINE | Facility: CLINIC | Age: 50
End: 2020-07-27

## 2020-07-27 DIAGNOSIS — K13.79 MOUTH SORE: Primary | ICD-10-CM

## 2020-07-27 NOTE — TELEPHONE ENCOUNTER
Susie is calling to say that the rinse she has been using is not helping.  She would like to know the next step.    Patient's phone #[phone]

## 2020-08-13 ENCOUNTER — OFFICE VISIT (OUTPATIENT)
Dept: FAMILY MEDICINE | Facility: CLINIC | Age: 50
End: 2020-08-13

## 2020-08-13 VITALS
BODY MASS INDEX: 18.8 KG/M2 | HEIGHT: 66 IN | WEIGHT: 117 LBS | DIASTOLIC BLOOD PRESSURE: 76 MMHG | SYSTOLIC BLOOD PRESSURE: 116 MMHG | RESPIRATION RATE: 20 BRPM | HEART RATE: 100 BPM | TEMPERATURE: 97.3 F

## 2020-08-13 DIAGNOSIS — F41.9 ANXIETY: ICD-10-CM

## 2020-08-13 DIAGNOSIS — Z01.818 PRE-OP EXAM: Primary | ICD-10-CM

## 2020-08-13 DIAGNOSIS — M19.049 ARTHRITIS OF FINGER: ICD-10-CM

## 2020-08-13 DIAGNOSIS — Z87.891 PERSONAL HISTORY OF TOBACCO USE, PRESENTING HAZARDS TO HEALTH: ICD-10-CM

## 2020-08-13 LAB
ERYTHROCYTE [DISTWIDTH] IN BLOOD BY AUTOMATED COUNT: 11 %
HCT VFR BLD AUTO: 40.3 % (ref 35–47)
HEMOGLOBIN: 13.9 G/DL (ref 11.7–15.7)
MCH RBC QN AUTO: 35.4 PG (ref 26–33)
MCHC RBC AUTO-ENTMCNC: 34.5 G/DL (ref 31–36)
MCV RBC AUTO: 102.6 FL (ref 78–100)
PLATELET COUNT - QUEST: 348 10^9/L (ref 150–375)
RBC # BLD AUTO: 3.93 10*12/L (ref 3.8–5.2)
WBC # BLD AUTO: 6.1 10*9/L (ref 4–11)

## 2020-08-13 PROCEDURE — 85027 COMPLETE CBC AUTOMATED: CPT | Performed by: PHYSICIAN ASSISTANT

## 2020-08-13 PROCEDURE — 36415 COLL VENOUS BLD VENIPUNCTURE: CPT | Performed by: PHYSICIAN ASSISTANT

## 2020-08-13 PROCEDURE — 99214 OFFICE O/P EST MOD 30 MIN: CPT | Performed by: PHYSICIAN ASSISTANT

## 2020-08-13 SDOH — HEALTH STABILITY: MENTAL HEALTH: HOW MANY STANDARD DRINKS CONTAINING ALCOHOL DO YOU HAVE ON A TYPICAL DAY?: 1 OR 2

## 2020-08-13 ASSESSMENT — MIFFLIN-ST. JEOR: SCORE: 1162.46

## 2020-08-13 NOTE — PROGRESS NOTES
[facility]  [address]  [address]  [facility] [address]  [phone]  Dept: [phone]    PRE-OP EVALUATION:  Today's date: 2020    Susie Santana (: 1970) presents for pre-operative evaluation assessment as requested by Dr. Black.  She requires evaluation and anesthesia risk assessment prior to undergoing surgery/procedure for treatment of right 2nd finger    Proposed Surgery/ Procedure: right finger  Date of Surgery/ Procedure: 20  Time of Surgery/ Procedure: [facility]/Surgical Facility: [facility]  Surgery Fax Number:   Primary Physician: Zoey Evans  Type of Anesthesia Anticipated: to be determined    Preoperative Questionnaire:   No - Have you ever had a heart attack or stroke?  No - Have you ever had surgery on your heart or blood vessels, such as a stent, coronary (heart) bypass, or surgery on an artery in the head, neck, heart, or legs?  No - Do you have chest pain when you are physically active?  No - Do you have a history of heart failure?  No - Do you currently have a cold, bronchitis, or symptoms of other respiratory (head and chest) infections?  No - Do you have a cough, shortness of breath, or wheezing?  No - Do you or anyone in your family have a history of blood clots?  No - Do you or anyone in your family have a serious bleeding problem, such as long-lasting bleeding after surgeries or cuts?  YES - Have you ever had anemia or been told to take iron pills? Many years ago - diet related  No - Have you had any abnormal blood loss such as black, tarry or bloody stools, or abnormal vaginal bleeding?  No - Have you ever had a blood transfusion?  Yes - Are you willing to have a blood transfusion if it is medically needed before, during, or after your surgery?  YES - Have you or anyone in your family ever had problems with anesthesia (sedation for surgery)? Very sensitive and hard to recover per pt.   No - Do you have sleep apnea,  excessive snoring, or daytime drowsiness?   No - Do you have any artifical heart valves or other implanted medical devices, such as a pacemaker, defibrillator, or continuous glucose monitor?  No - Do you have any artifical joints?  YES - Are you allergic to latex?  No - Is there any chance that you may be pregnant?    Patient has a Health Care Directive or Living Will:  YES     HPI:     HPI related to upcoming procedure: Pain in right hand/finger for 1.5 years      See problem list for active medical problems.  Problems all longstanding and stable, except as noted/documented.  See ROS for pertinent symptoms related to these conditions.      MEDICAL HISTORY:     Patient Active Problem List    Diagnosis Date Noted     Anxiety 08/13/2020     Priority: Medium     Chondromalacia of patella 10/07/2004     Priority: Medium     Polyp of nasal cavity 09/27/2001     Priority: Medium     Personal history of tobacco use, presenting hazards to health 04/20/2000     Priority: Medium     Ganglion 04/20/2000     Priority: Medium     Problem list name updated by automated process. Provider to review       Family history of malignant neoplasm of gastrointestinal tract      Priority: Medium     Father, PGM       Allergic rhinitis      Priority: Medium     Problem list name updated by automated process. Provider to review       ACP (advance care planning) 12/21/2017     Priority: Low     Advance Care Planning 12/21/2017: ACP Review of Chart / Resources Provided:  Reviewed chart for advance care plan.  Susie Santana has no plan or code status on file. Discussed available resources and patient declined information today.   Added by [facility] 06/05/2013     Priority: Low     State Tier Level:  0  Status:  N/A  Care Coordinator:  N/A    See Letters for H Care Plan            Past Medical History:   Diagnosis Date     Family history of malignant neoplasm of gastrointestinal tract      Past Surgical  "History:   Procedure Laterality Date     C LIGATE FALLOPIAN TUBE,POSTPARTUM  2005    Tubal Ligation     HC TOOTH EXTRACTION W/FORCEP       Current Outpatient Medications   Medication Sig Dispense Refill     ALPRAZolam (XANAX) 0.25 MG tablet Take 0.25 mg by mouth as needed       escitalopram (LEXAPRO) 5 MG tablet Take 5 mg by mouth daily       OTC products: none    Allergies   Allergen Reactions     Cephalosporins Hives     Dogs Itching     Latex      palpitations;     Sulfa Drugs Hives      Latex Allergy: YES: Precautions to take: AVOID ALL LATEX    Social History     Tobacco Use     Smoking status: Current Every Day Smoker     Packs/day: 0.50     Years: 30.00     Pack years: 15.00     Smokeless tobacco: Never Used   Substance Use Topics     Alcohol use: Yes     Alcohol/week: 13.3 standard drinks     Drinks per session: 1 or 2     History   Drug Use No       REVIEW OF SYSTEMS:   Constitutional, neuro, ENT, endocrine, pulmonary, cardiac, gastrointestinal, genitourinary, musculoskeletal, integument and psychiatric systems are negative, except as otherwise noted.    EXAM:   /76 (BP Location: Right arm, Patient Position: Chair, Cuff Size: Adult Regular)   Pulse 100   Temp 97.3  F (36.3  C)   Resp 20   Ht 1.676 m (5' 6\")   Wt 53.1 kg (117 lb)   BMI 18.88 kg/m      GENERAL APPEARANCE: healthy, alert and no distress     EYES: EOMI, PERRL     HENT: ear canals and TM's normal and nose and mouth without ulcers or lesions     NECK: no adenopathy, no asymmetry, masses, or scars and thyroid normal to palpation     RESP: lungs clear to auscultation - no rales, rhonchi or wheezes     CV: regular rates and rhythm, normal S1 S2, no S3 or S4 and no murmur, click or rub     ABDOMEN:  soft, nontender, no HSM or masses and bowel sounds normal     MS: DIP joint 2nd finger right mildly tender     SKIN: no suspicious lesions or rashes     NEURO: Normal strength and tone, sensory exam grossly normal, mentation intact and speech " normal     PSYCH: mentation appears normal. and affect normal/bright     LYMPHATICS: No cervical adenopathy    DIAGNOSTICS:     Labs Resulted Today:   Results for orders placed or performed in visit on 08/13/20   HEMOGRAM/PLATELET (BFP)     Status: Abnormal   Result Value Ref Range    WBC 6.1 4.0 - 11 10*9/L    RBC Count 3.93 3.8 - 5.2 10*12/L    Hemoglobin 13.9 11.7 - 15.7 g/dL    Hematocrit 40.3 35.0 - 47.0 %    .6 (A) 78 - 100 fL    MCH 35.4 (A) 26 - 33 pg    MCHC 34.5 31 - 36 g/dL    RDW 11.0 %    Platelet Count 348 150 - 375 10^9/L       Recent Labs   Lab Test 12/21/17  1559 08/15/17  0844 08/14/17  1341 07/08/13  1442   HGB 14.4  --   --  13.8     --   --  304   NA  --   --  137  --    POTASSIUM  --   --  4.9  --    CR  --   --  0.78  --    A1C  --  4.9  --   --         IMPRESSION:   Reason for surgery/procedure: finger arthritis  Diagnosis/reason for consult:  Preoperative clearance      The proposed surgical procedure is considered INTERMEDIATE risk.    REVISED CARDIAC RISK INDEX  The patient has the following serious cardiovascular risks for perioperative complications such as (MI, PE, VFib and 3  AV Block):  No serious cardiac risks  INTERPRETATION: 0 risks: Class I (very low risk - 0.4% complication rate)    The patient has the following additional risks for perioperative complications:  No identified additional risks      ICD-10-CM    1. Pre-op exam  Z01.818 HEMOGRAM/PLATELET (BFP)     VENOUS COLLECTION   2. Arthritis of finger  M19.049    3. Personal history of tobacco use, presenting hazards to health  Z87.891    4. Anxiety  F41.9        RECOMMENDATIONS:         --Patient is to take all scheduled medications on the day of surgery     APPROVAL GIVEN to proceed with proposed procedure, without further diagnostic evaluation       Signed Electronically by: Zoey Evans PA-C    Copy of this evaluation report is provided to requesting physician.    [place] Preop  Guidelines    Revised Cardiac Risk Index

## 2020-08-13 NOTE — NURSING NOTE
Susie Santana is here for a pre-op exam.    Questioned patient about current smoking habits.  Pt. currently smokes.  Advised about smoking cessation.  PULSE regular  My Chart: declines  CLASSIFICATION OF OVERWEIGHT AND OBESITY BY BMI                        Obesity Class           BMI(kg/m2)  Underweight                                    < 18.5  Normal                                         18.5-24.9  Overweight                                     25.0-29.9  OBESITY                     I                  30.0-34.9                             II                 35.0-39.9  EXTREME OBESITY             III                >40                            Patient's  BMI Body mass index is 18.88 kg/m .  http://hin.nhlbi.nih.gov/menuplanner/menu.cgi  Pre-visit planning  Immunizations - up to date  Colonoscopy - is due and to be scheduled by patient for later completion  Mammogram - is up to date  Asthma -   PHQ9 -    DAVID-7 -

## 2020-08-13 NOTE — LETTER
[facility]  [address]  [address]  [facility] [address]  [phone]  Dept: [phone]    PRE-OP EVALUATION:  Today's date: 2020    Susie Santana (: 1970) presents for pre-operative evaluation assessment as requested by Dr. Black.  She requires evaluation and anesthesia risk assessment prior to undergoing surgery/procedure for treatment of right 2nd finger    Proposed Surgery/ Procedure: right finger  Date of Surgery/ Procedure: 20  Time of Surgery/ Procedure: [facility]urgical Facility: [facility]  Surgery Fax Number:   Primary Physician: Zoey Evans  Type of Anesthesia Anticipated: to be determined    Preoperative Questionnaire:   No - Have you ever had a heart attack or stroke?  No - Have you ever had surgery on your heart or blood vessels, such as a stent, coronary (heart) bypass, or surgery on an artery in the head, neck, heart, or legs?  No - Do you have chest pain when you are physically active?  No - Do you have a history of heart failure?  No - Do you currently have a cold, bronchitis, or symptoms of other respiratory (head and chest) infections?  No - Do you have a cough, shortness of breath, or wheezing?  No - Do you or anyone in your family have a history of blood clots?  No - Do you or anyone in your family have a serious bleeding problem, such as long-lasting bleeding after surgeries or cuts?  YES - Have you ever had anemia or been told to take iron pills? Many years ago - diet related  No - Have you had any abnormal blood loss such as black, tarry or bloody stools, or abnormal vaginal bleeding?  No - Have you ever had a blood transfusion?  Yes - Are you willing to have a blood transfusion if it is medically needed before, during, or after your surgery?  YES - Have you or anyone in your family ever had problems with anesthesia (sedation for surgery)? Very sensitive and hard to recover per pt.   No - Do you have sleep apnea,  excessive snoring, or daytime drowsiness?   No - Do you have any artifical heart valves or other implanted medical devices, such as a pacemaker, defibrillator, or continuous glucose monitor?  No - Do you have any artifical joints?  YES - Are you allergic to latex?  No - Is there any chance that you may be pregnant?    Patient has a Health Care Directive or Living Will:  YES     HPI:     HPI related to upcoming procedure: Pain in right hand/finger for 1.5 years      See problem list for active medical problems.  Problems all longstanding and stable, except as noted/documented.  See ROS for pertinent symptoms related to these conditions.      MEDICAL HISTORY:     Patient Active Problem List    Diagnosis Date Noted     Anxiety 08/13/2020     Priority: Medium     Chondromalacia of patella 10/07/2004     Priority: Medium     Polyp of nasal cavity 09/27/2001     Priority: Medium     Personal history of tobacco use, presenting hazards to health 04/20/2000     Priority: Medium     Ganglion 04/20/2000     Priority: Medium     Problem list name updated by automated process. Provider to review       Family history of malignant neoplasm of gastrointestinal tract      Priority: Medium     Father, PGM       Allergic rhinitis      Priority: Medium     Problem list name updated by automated process. Provider to review       ACP (advance care planning) 12/21/2017     Priority: Low     Advance Care Planning 12/21/2017: ACP Review of Chart / Resources Provided:  Reviewed chart for advance care plan.  Susie Santana has no plan or code status on file. Discussed available resources and patient declined information today.   Added by [facility] 06/05/2013     Priority: Low     State Tier Level:  0  Status:  N/A  Care Coordinator:  N/A    See Letters for H Care Plan            Past Medical History:   Diagnosis Date     Family history of malignant neoplasm of gastrointestinal tract      Past Surgical  "History:   Procedure Laterality Date     C LIGATE FALLOPIAN TUBE,POSTPARTUM  2005    Tubal Ligation     HC TOOTH EXTRACTION W/FORCEP       Current Outpatient Medications   Medication Sig Dispense Refill     ALPRAZolam (XANAX) 0.25 MG tablet Take 0.25 mg by mouth as needed       escitalopram (LEXAPRO) 5 MG tablet Take 5 mg by mouth daily       OTC products: none    Allergies   Allergen Reactions     Cephalosporins Hives     Dogs Itching     Latex      palpitations;     Sulfa Drugs Hives      Latex Allergy: YES: Precautions to take: AVOID ALL LATEX    Social History     Tobacco Use     Smoking status: Current Every Day Smoker     Packs/day: 0.50     Years: 30.00     Pack years: 15.00     Smokeless tobacco: Never Used   Substance Use Topics     Alcohol use: Yes     Alcohol/week: 13.3 standard drinks     Drinks per session: 1 or 2     History   Drug Use No       REVIEW OF SYSTEMS:   Constitutional, neuro, ENT, endocrine, pulmonary, cardiac, gastrointestinal, genitourinary, musculoskeletal, integument and psychiatric systems are negative, except as otherwise noted.    EXAM:   /76 (BP Location: Right arm, Patient Position: Chair, Cuff Size: Adult Regular)   Pulse 100   Temp 97.3  F (36.3  C)   Resp 20   Ht 1.676 m (5' 6\")   Wt 53.1 kg (117 lb)   BMI 18.88 kg/m      GENERAL APPEARANCE: healthy, alert and no distress     EYES: EOMI, PERRL     HENT: ear canals and TM's normal and nose and mouth without ulcers or lesions     NECK: no adenopathy, no asymmetry, masses, or scars and thyroid normal to palpation     RESP: lungs clear to auscultation - no rales, rhonchi or wheezes     CV: regular rates and rhythm, normal S1 S2, no S3 or S4 and no murmur, click or rub     ABDOMEN:  soft, nontender, no HSM or masses and bowel sounds normal     MS: DIP joint 2nd finger right mildly tender     SKIN: no suspicious lesions or rashes     NEURO: Normal strength and tone, sensory exam grossly normal, mentation intact and speech " normal     PSYCH: mentation appears normal. and affect normal/bright     LYMPHATICS: No cervical adenopathy    DIAGNOSTICS:     Labs Resulted Today:   Results for orders placed or performed in visit on 08/13/20   HEMOGRAM/PLATELET (BFP)     Status: Abnormal   Result Value Ref Range    WBC 6.1 4.0 - 11 10*9/L    RBC Count 3.93 3.8 - 5.2 10*12/L    Hemoglobin 13.9 11.7 - 15.7 g/dL    Hematocrit 40.3 35.0 - 47.0 %    .6 (A) 78 - 100 fL    MCH 35.4 (A) 26 - 33 pg    MCHC 34.5 31 - 36 g/dL    RDW 11.0 %    Platelet Count 348 150 - 375 10^9/L       Recent Labs   Lab Test 12/21/17  1559 08/15/17  0844 08/14/17  1341 07/08/13  1442   HGB 14.4  --   --  13.8     --   --  304   NA  --   --  137  --    POTASSIUM  --   --  4.9  --    CR  --   --  0.78  --    A1C  --  4.9  --   --         IMPRESSION:   Reason for surgery/procedure: finger arthritis  Diagnosis/reason for consult:  Preoperative clearance      The proposed surgical procedure is considered INTERMEDIATE risk.    REVISED CARDIAC RISK INDEX  The patient has the following serious cardiovascular risks for perioperative complications such as (MI, PE, VFib and 3  AV Block):  No serious cardiac risks  INTERPRETATION: 0 risks: Class I (very low risk - 0.4% complication rate)    The patient has the following additional risks for perioperative complications:  No identified additional risks      ICD-10-CM    1. Pre-op exam  Z01.818 HEMOGRAM/PLATELET (BFP)     VENOUS COLLECTION   2. Arthritis of finger  M19.049    3. Personal history of tobacco use, presenting hazards to health  Z87.891    4. Anxiety  F41.9        RECOMMENDATIONS:         --Patient is to take all scheduled medications on the day of surgery     APPROVAL GIVEN to proceed with proposed procedure, without further diagnostic evaluation       Signed Electronically by: Zoey Evans PA-C    Copy of this evaluation report is provided to requesting physician.    [place] Preop  Guidelines    Revised Cardiac Risk Index

## 2020-09-28 ENCOUNTER — ALLIED HEALTH/NURSE VISIT (OUTPATIENT)
Dept: FAMILY MEDICINE | Facility: CLINIC | Age: 50
End: 2020-09-28

## 2020-09-28 DIAGNOSIS — Z23 NEED FOR VACCINATION: Primary | ICD-10-CM

## 2020-09-28 PROCEDURE — 90471 IMMUNIZATION ADMIN: CPT | Performed by: PHYSICIAN ASSISTANT

## 2020-09-28 PROCEDURE — 90686 IIV4 VACC NO PRSV 0.5 ML IM: CPT | Performed by: PHYSICIAN ASSISTANT

## 2020-09-29 ENCOUNTER — TRANSFERRED RECORDS (OUTPATIENT)
Dept: FAMILY MEDICINE | Facility: CLINIC | Age: 50
End: 2020-09-29

## 2021-07-29 ENCOUNTER — HOSPITAL ENCOUNTER (OUTPATIENT)
Dept: MAMMOGRAPHY | Facility: CLINIC | Age: 51
Discharge: HOME | End: 2021-07-29
Attending: OBSTETRICS & GYNECOLOGY | Admitting: OBSTETRICS & GYNECOLOGY
Payer: COMMERCIAL

## 2021-07-29 DIAGNOSIS — Z12.31 VISIT FOR SCREENING MAMMOGRAM: ICD-10-CM

## 2021-07-29 PROCEDURE — 77063 BREAST TOMOSYNTHESIS BI: CPT

## 2021-09-27 ENCOUNTER — ALLIED HEALTH/NURSE VISIT (OUTPATIENT)
Dept: FAMILY MEDICINE | Facility: CLINIC | Age: 51
End: 2021-09-27

## 2021-09-27 DIAGNOSIS — Z23 NEED FOR VACCINATION: Primary | ICD-10-CM

## 2021-09-27 PROCEDURE — 90471 IMMUNIZATION ADMIN: CPT | Performed by: PHYSICIAN ASSISTANT

## 2021-09-27 PROCEDURE — 90686 IIV4 VACC NO PRSV 0.5 ML IM: CPT | Performed by: PHYSICIAN ASSISTANT

## 2022-01-05 ENCOUNTER — OFFICE VISIT (OUTPATIENT)
Dept: FAMILY MEDICINE | Facility: CLINIC | Age: 52
End: 2022-01-05

## 2022-01-05 VITALS
SYSTOLIC BLOOD PRESSURE: 120 MMHG | BODY MASS INDEX: 19.61 KG/M2 | WEIGHT: 122 LBS | HEART RATE: 74 BPM | DIASTOLIC BLOOD PRESSURE: 76 MMHG | OXYGEN SATURATION: 99 % | HEIGHT: 66 IN | TEMPERATURE: 97.2 F

## 2022-01-05 DIAGNOSIS — R10.2 PELVIC PRESSURE IN FEMALE: Primary | ICD-10-CM

## 2022-01-05 DIAGNOSIS — B96.89 BACTERIAL VAGINOSIS: ICD-10-CM

## 2022-01-05 DIAGNOSIS — N76.0 BACTERIAL VAGINOSIS: ICD-10-CM

## 2022-01-05 LAB
APPEARANCE UR: CLEAR
BACTERIA (WET PREP): ABNORMAL
BACTERIA, UR: ABNORMAL
BILIRUB UR QL: ABNORMAL
CASTS/LPF: ABNORMAL
CLUE CELLS: POSITIVE
COLOR UR: YELLOW
EP/HPF: ABNORMAL
GLUCOSE URINE: ABNORMAL MG/DL
HGB UR QL: ABNORMAL
KETONES UR QL: ABNORMAL MG/DL
MISC.: ABNORMAL
NITRITE UR QL STRIP: ABNORMAL
PH UR STRIP: 7 PH (ref 5–7)
PH WET PREP: 4 (ref 3.5–4.5)
PMNS (WET PREP): ABNORMAL
PROT UR QL: ABNORMAL MG/DL
RBC, UR MICRO: ABNORMAL (ref ?–2)
SP GR UR STRIP: 1.01 (ref 1–1.03)
TRICHOMONAS VAGINALS: ABNORMAL
UROBILINOGEN UR QL STRIP: 0.2 EU/DL (ref 0.2–1)
WBC #/AREA URNS HPF: ABNORMAL /[HPF]
WBC, UR MICRO: ABNORMAL (ref ?–2)
YEAST CELLS: ABNORMAL

## 2022-01-05 PROCEDURE — 87210 SMEAR WET MOUNT SALINE/INK: CPT | Performed by: PHYSICIAN ASSISTANT

## 2022-01-05 PROCEDURE — 81001 URINALYSIS AUTO W/SCOPE: CPT | Performed by: PHYSICIAN ASSISTANT

## 2022-01-05 PROCEDURE — 99213 OFFICE O/P EST LOW 20 MIN: CPT | Performed by: PHYSICIAN ASSISTANT

## 2022-01-05 RX ORDER — METRONIDAZOLE 7.5 MG/G
1 GEL VAGINAL DAILY
Qty: 70 G | Refills: 0 | Status: SHIPPED | OUTPATIENT
Start: 2022-01-05 | End: 2022-01-10

## 2022-01-05 ASSESSMENT — MIFFLIN-ST. JEOR: SCORE: 1175.14

## 2022-01-05 NOTE — NURSING NOTE
Chief Complaint   Patient presents with     Urinary Problem     urinary/pelvic pressure, not a lot of urine output, sx started over a week ago         Pre-visit Screening:  Immunizations:  up to date  Colonoscopy:  is up to date  Mammogram: is up to date  Asthma Action Test/Plan:  NA  PHQ9:  NA  GAD7:  NA  Questioned patient about current smoking habits Pt. smokes 10 cigerettes a day  Ok to leave detailed message on voice mail for today's visit only Yes, phone # [phone]

## 2022-01-05 NOTE — PROGRESS NOTES
"CC: UTI?    History:  7-10 days ago, started to noticed vague sensation of urinary pressure, only when urinating. Also urinating more frequently. In general feeling abdominal bloating. No change in appearance, odor. No burning, stinging, urgency, back pain, fever, sweats, chills. Has been under more stress lately.     3 weeks ago, fell on ice, and injured left knee, and left wrist. Is still having some mild pain on outside of left knee, and only certain pressure, kneeling.    PMH, MEDICATIONS, ALLERGIES, SOCIAL AND FAMILY HISTORY in [facility] and reviewed by me personally.    ROS negative other than the symptoms noted above in the HPI.    Examination   /76 (BP Location: Left arm, Patient Position: Sitting, Cuff Size: Adult Regular)   Pulse 74   Temp 97.2  F (36.2  C) (Temporal)   Ht 1.676 m (5' 6\")   Wt 55.3 kg (122 lb)   SpO2 99%   BMI 19.69 kg/m         Constitutional: Sitting comfortably, in no acute distress. Vital signs noted  Neck:  no adenopathy, trachea midline and normal to palpation, thyroid normal to palpation  Cardiovascular:  regular rate and rhythm, no murmurs, clicks, or gallops  Respiratory:  normal respiratory rate and rhythm, lungs clear to auscultation  :  External genitalia without abnormality. No significant atrophic changes. Moderate amount of thin white/yellow discharge.  Cervix without abnormality.   M/S: Left knee without contusion, edema, erythema. Patella tracking intact. No tenderness to palpation.   SKIN: No jaundice/pallor/rash.   Psychiatric: mentation appears normal and affect normal/bright        A/P    ICD-10-CM    1. Pelvic pressure in female  R10.2 URINALYSIS, ROUTINE (BFP)     WET PREP (BFP)   2. Bacterial vaginosis  N76.0 metroNIDAZOLE (METROGEL) 0.75 % vaginal gel    B96.89      DISCUSSION:  UA today appears normal. No evidence of UTI. Did spend time discussing pelvic floor, may be a tension reaction, could try stretching. However, wet prep did show bacteria, clue " cells. Agreed to trial of Metrogel nightly for 5 nights. Not currently sexually active. Symptoms should resolve over the next 2 weeks or sooner. If ongoing would consider referral to PT.     Left knee pain:  Reassured by normal tracking, no tenderness on exam. Could try applying ice twice daily 15-20 minutes. Should improve/resolve over the next several weeks.     follow up visit:     Karla Zamora PA-C  [facility]

## 2022-08-01 ENCOUNTER — HOSPITAL ENCOUNTER (OUTPATIENT)
Dept: MAMMOGRAPHY | Facility: CLINIC | Age: 52
Discharge: HOME | End: 2022-08-01
Attending: OBSTETRICS & GYNECOLOGY | Admitting: OBSTETRICS & GYNECOLOGY
Payer: COMMERCIAL

## 2022-08-01 DIAGNOSIS — Z12.31 VISIT FOR SCREENING MAMMOGRAM: ICD-10-CM

## 2022-08-01 PROCEDURE — 77067 SCR MAMMO BI INCL CAD: CPT

## 2022-10-06 ENCOUNTER — ALLIED HEALTH/NURSE VISIT (OUTPATIENT)
Dept: FAMILY MEDICINE | Facility: CLINIC | Age: 52
End: 2022-10-06

## 2022-10-06 DIAGNOSIS — Z23 NEED FOR VACCINATION: Primary | ICD-10-CM

## 2022-10-06 PROCEDURE — 90686 IIV4 VACC NO PRSV 0.5 ML IM: CPT | Performed by: PHYSICIAN ASSISTANT

## 2022-10-06 PROCEDURE — 90471 IMMUNIZATION ADMIN: CPT | Performed by: PHYSICIAN ASSISTANT

## 2023-08-24 ENCOUNTER — HOSPITAL ENCOUNTER (OUTPATIENT)
Dept: MAMMOGRAPHY | Facility: CLINIC | Age: 53
Discharge: HOME | End: 2023-08-24
Attending: OBSTETRICS & GYNECOLOGY | Admitting: OBSTETRICS & GYNECOLOGY
Payer: COMMERCIAL

## 2023-08-24 DIAGNOSIS — Z12.31 VISIT FOR SCREENING MAMMOGRAM: ICD-10-CM

## 2023-08-24 PROCEDURE — 77067 SCR MAMMO BI INCL CAD: CPT

## 2023-10-02 ENCOUNTER — OFFICE VISIT (OUTPATIENT)
Dept: FAMILY MEDICINE | Facility: CLINIC | Age: 53
End: 2023-10-02

## 2023-10-02 VITALS
WEIGHT: 122 LBS | DIASTOLIC BLOOD PRESSURE: 74 MMHG | TEMPERATURE: 98.4 F | HEIGHT: 66 IN | HEART RATE: 87 BPM | OXYGEN SATURATION: 96 % | BODY MASS INDEX: 19.61 KG/M2 | SYSTOLIC BLOOD PRESSURE: 118 MMHG

## 2023-10-02 DIAGNOSIS — R07.81 RIB PAIN ON LEFT SIDE: Primary | ICD-10-CM

## 2023-10-02 PROCEDURE — 71101 X-RAY EXAM UNILAT RIBS/CHEST: CPT | Performed by: FAMILY MEDICINE

## 2023-10-02 PROCEDURE — 99214 OFFICE O/P EST MOD 30 MIN: CPT | Performed by: FAMILY MEDICINE

## 2023-10-02 NOTE — NURSING NOTE
Chief Complaint   Patient presents with    Rib Pain     Left sided rib pain, fell down the stairs on Saturday, bending and breathing deeply worsens the pain     Pre-visit Screening:  Immunizations:  not up to date - shingrix and tdap at pharmacy  Colonoscopy:  is due and to be scheduled by patient for later completion  Mammogram: is due and to be scheduled by patient for later completion  Asthma Action Test/Plan:  NA  PHQ9:  NA  GAD7:  NA  Questioned patient about current smoking habits Pt. Smokes.  Ok to leave detailed message on voice mail for today's visit only Yes, phone # [phone]

## 2023-10-02 NOTE — LETTER
October 3, 2023      Susie Santana  [address] [facility] [address]        Deasveta aY,    We are writing to inform you of your test results.  The radiologist did not think you have a rib fracture. So the rib is bruised. Please let me know If you have any questions.    Resulted Orders   XR Ribs & Chest Lt 3v    Narrative    Radiologist Consultation/:   Fax:  [phone]  [phone]  _____________________________________________________________________________________________________________________________________________________________________________________________________________________________________________________________________________________________________________________________________________________________________________________________________________________________________________________________________________________________________  PATIENT NAME: LANDRU, SUSIE A  YOB: 1970 Age: 52 ACCESSION NUMBER: [id number]  SEX: F ORDERING PROVIDER: Marylu Jose  FACILITY: [facility] PRIMARY PROVIDER:  PATIENT ID: [id number] INTERESTED PARTY:  Page 1 of 1  _________________________________________________________________________________________________________________________________________________________________________________________________________________________________________________________________________________________________________________________________________________________________________________________  EXAM: XRAY RIBS,WILLIAN BAINS CH LEFT  LOCATION: [facility]  DATE: 10/2/2023  INDICATION: Rib pain on left side.  COMPARISON: None.  IMPRESSION: No acute left rib fracture. Left lower ribs are suboptimally visualized due to overlapping bowel  gas. No pleural effusion. No pneumothorax. No infiltrate. Mild thoracolumbar curvature and degenerative  changes in the spine.  SIGNED BY:  Laz Arrington MD 10/3/2023 7:50 AM       If you have any questions or concerns, please call the clinic at the number listed above.       Sincerely,      Marylu Garcia MD

## 2023-10-02 NOTE — PROGRESS NOTES
Assessment & Plan   Problem List Items Addressed This Visit    None  Visit Diagnoses       Rib pain on left side    -  Primary    Relevant Orders    XR Ribs & Chest Lt 3v           1. Rib pain on left side  This appears to be a left rib fracture. Treat symptomatically, deep breaths regularly. If changes or shortness of breath, worsening symptoms, should be seen urgently. Offered pain medications, she prefers to use otc medications.  - XR Ribs & Chest Lt 3v            Nicotine/Tobacco Cessation:  She reports that she has been smoking cigarettes. She has a 15.00 pack-year smoking history. She has been exposed to tobacco smoke. She has never used smokeless tobacco.  Nicotine/Tobacco Cessation Plan:           FUTURE APPOINTMENTS:       - Follow-up visit as needed.    No follow-ups on file.    Marylu Garcia MD  [facility]    Subjective     Nursing Notes:   Génesis Conrad CMA  10/2/2023  2:39 PM  Signed  Chief Complaint   Patient presents with    Rib Pain     Left sided rib pain, fell down the stairs on Saturday, bending and breathing deeply worsens the pain     Pre-visit Screening:  Immunizations:  not up to date - shingrix and tdap at pharmacy  Colonoscopy:  is due and to be scheduled by patient for later completion  Mammogram: is due and to be scheduled by patient for later completion  Asthma Action Test/Plan:  NA  PHQ9:  NA  GAD7:  NA  Questioned patient about current smoking habits Pt. Smokes.  Ok to leave detailed message on voice mail for today's visit only Yes, phone # [phone]       Susie Santana is a 52 year old female who presents to clinic today for the following health issues   HPI     Fell Saturday on her stairs, going quickly. Left lower lateral rib area pain, no bruising, felt it when moving out of bed this morning. No breathing problems.        Review of Systems   Constitutional, HEENT, cardiovascular, pulmonary, gi and gu systems are negative, except as otherwise noted.     "  Objective    /74 (BP Location: Right arm, Patient Position: Sitting, Cuff Size: Adult Regular)   Pulse 87   Temp 98.4  F (36.9  C) (Temporal)   Ht 1.676 m (5' 6\")   Wt 55.3 kg (122 lb)   SpO2 96%   BMI 19.69 kg/m    Body mass index is 19.69 kg/m .  Physical Exam   GENERAL: healthy, alert and no distress  RESP: lungs clear to auscultation - no rales, rhonchi or wheezes  CV: regular rate and rhythm, normal S1 S2, no S3 or S4, no murmur, click or rub, no peripheral edema and peripheral pulses strong  MS: no gross musculoskeletal defects noted, no edema  NEURO: Normal strength and tone, mentation intact and speech normal  PSYCH: mentation appears normal, affect normal/bright  Left lower lateral rib area tender to palpation point tender area    Xray sent for overread, left lower lateral rib fracture.      "

## 2024-06-21 NOTE — PROGRESS NOTES
Preventive Care Visit  [facility], PNINFA Garcia MD, Family Medicine  Jul 8, 2024       SUBJECTIVE:   Susie is a 53 year old, presenting for the following:  Physical (Fasting today, she had [facility] last [facility] ) and Hypertension (Elevated BP recently, she noticed this has been elevated since she stopped smoking )      HPI      Here for a physical.   Followed by psychiatry for her anxiety.  Recently stopped smoking.  Blood pressure is high, since she quit smoking. She is working on quitting, vaping.  Smoked 35 years, and then depending on the day the amount. Thinks 20 pack years.   Had some headache after a surgery recently. Also had some ringing in the head. Also at that time quit smoking. This was the first time had high blood pressure. But previously didn't have high blood pressure. Sister had hypertension. Also it was high at her pap smear. 140/?          Have you ever done Advance Care Planning? (For example, a Health Directive, POLST, or a discussion with a medical provider or your loved ones about your wishes): No, advance care planning information given to patient to review.  Patient plans to discuss their wishes with loved ones or provider.      Social History     Tobacco Use    Smoking status: Former     Current packs/day: 0.50     Average packs/day: 0.5 packs/day for 30.0 years (15.0 ttl pk-yrs)     Types: Cigarettes     Passive exposure: Current    Smokeless tobacco: Never    Tobacco comments:     Quit cigarettes 05/27/24, vapes now   Substance Use Topics    Alcohol use: Yes     Alcohol/week: 14.0 standard drinks of alcohol     Types: 14 Standard drinks or equivalent per week              No data to display            No concerns  Reviewed orders with patient.  Reviewed health maintenance and updated orders accordingly - Yes  BP Readings from Last 3 Encounters:   07/08/24 (!) 156/92   10/02/23 118/74   01/05/22 120/76    Wt Readings from Last 3 Encounters:    07/08/24 57.6 kg (127 lb)   10/02/23 55.3 kg (122 lb)   01/05/22 55.3 kg (122 lb)                  Patient Active Problem List   Diagnosis    Allergic rhinitis    Family history of malignant neoplasm of gastrointestinal tract    Personal history of tobacco use, presenting hazards to health    Ganglion    Polyp of nasal cavity    Chondromalacia of patella    ACP (advance care planning)    Anxiety--followed by psychiatry    Elevated blood pressure reading without diagnosis of hypertension     Past Surgical History:   Procedure Laterality Date    HC TOOTH EXTRACTION W/FORCEP      ZZC LIGATE FALLOPIAN TUBE,POSTPARTUM  2005    Tubal Ligation       Social History     Tobacco Use    Smoking status: Former     Current packs/day: 0.50     Average packs/day: 0.5 packs/day for 30.0 years (15.0 ttl pk-yrs)     Types: Cigarettes     Passive exposure: Current    Smokeless tobacco: Never    Tobacco comments:     Quit cigarettes 05/27/24, vapes now   Substance Use Topics    Alcohol use: Yes     Alcohol/week: 14.0 standard drinks of alcohol     Types: 14 Standard drinks or equivalent per week     Family History   Problem Relation Age of Onset    Cancer Father 60        colon    Anxiety Disorder Sister     Alzheimer Disease Paternal Grandmother     Cancer Paternal Grandmother         colon    Alzheimer Disease Paternal Grandfather     Alzheimer Disease Maternal Grandmother     Diabetes Maternal Grandfather     Heart Disease No family hx of     Breast Cancer No family hx of          Current Outpatient Medications   Medication Sig Dispense Refill    ALPRAZolam (XANAX) 0.25 MG tablet Take 0.25 mg by mouth as needed      escitalopram (LEXAPRO) 5 MG tablet Take 5 mg by mouth daily      lisinopril-hydrochlorothiazide (ZESTORETIC) 10-12.5 MG tablet Take 1 tablet by mouth daily 30 tablet 0       Breast Cancer Screening:    FHS-7:       7/29/2021    12:43 PM 7/29/2021    12:45 PM 8/1/2022     1:02 PM 8/24/2023     1:03 PM 8/24/2023     1:07  "PM   Breast CA Risk Assessment (FHS-7)   Did any of your first-degree relatives have breast or ovarian cancer? No  No  No   Did any of your relatives have bilateral breast cancer? No  No No No   Did any man in your family have breast cancer? No  No No No   Did any woman in your family have breast and ovarian cancer? No  No No No   Did any woman in your family have breast cancer before age 50 y? No  No No No   Do you have 2 or more relatives with breast and/or ovarian cancer? No  No No No   Do you have 2 or more relatives with breast and/or bowel cancer? No Yes Yes Yes Yes     Mammogram referral done.    Pertinent mammograms are reviewed under the imaging tab.      History of abnormal Pap smear: pap done recently with gynecology.            Reviewed and updated as needed this visit by clinical staff   Tobacco  Allergies               Reviewed and updated as needed this visit by Provider                  Past Medical History:   Diagnosis Date    Family history of malignant neoplasm of gastrointestinal tract       Past Surgical History:   Procedure Laterality Date    HC TOOTH EXTRACTION W/FORCEP      ZZC LIGATE FALLOPIAN TUBE,POSTPARTUM  2005    Tubal Ligation     Review of Systems    Review of Systems  Constitutional, HEENT, cardiovascular, pulmonary, gi and gu systems are negative, except as otherwise noted.    OBJECTIVE:   BP (!) 156/92 (BP Location: Right arm, Patient Position: Sitting, Cuff Size: Adult Regular)   Pulse 91   Temp 98.2  F (36.8  C) (Temporal)   Ht 1.651 m (5' 5\")   Wt 57.6 kg (127 lb)   SpO2 98%   BMI 21.13 kg/m     Estimated body mass index is 21.13 kg/m  as calculated from the following:    Height as of this encounter: 1.651 m (5' 5\").    Weight as of this encounter: 57.6 kg (127 lb).  Physical Exam  GENERAL: alert and no distress  EYES: Eyes grossly normal to inspection, PERRL and conjunctivae and sclerae normal  HENT: ear canals and TM's normal, nose and mouth without ulcers or " lesions  NECK: no adenopathy, no asymmetry, masses, or scars  RESP: lungs clear to auscultation - no rales, rhonchi or wheezes  CV: regular rate and rhythm, normal S1 S2, no S3 or S4, no murmur, click or rub, no peripheral edema  ABDOMEN: soft, nontender, no hepatosplenomegaly, no masses and bowel sounds normal  MS: no gross musculoskeletal defects noted, no edema  SKIN: no suspicious lesions or rashes  NEURO: Normal strength and tone, mentation intact and speech normal  PSYCH: mentation appears normal, affect normal/bright    Diagnostic Test Results:  Labs reviewed in Epic  No results found for any visits on 07/08/24.    ASSESSMENT/PLAN:   1. Encounter for routine history and physical exam in female patient    - TDAP VACCINE (Adacel, Boostrix)  [[id number]]  - VENOUS COLLECTION  - Basic Metabolic Panel (BFP)  - Lipid Panel (BFP)    2. Anxiety  Followed by psychiatry.    3. Personal history of tobacco use, presenting hazards to health  She is working on quitting.    4. Elevated blood pressure reading without diagnosis of hypertension  Start medications, discussed side effects. Recheck in clinic in 3-4 weeks. Watch home blood pressures, goal is less than 130/80  - lisinopril-hydrochlorothiazide (ZESTORETIC) 10-12.5 MG tablet; Take 1 tablet by mouth daily  Dispense: 30 tablet; Refill: 0    5. Screen for colon cancer    - Colonoscopy Screening  Referral    6. Encounter for screening mammogram for breast cancer    - MA Screening Bilateral w/ Mo; Future    7. Personal history of tobacco use  Discussed risks/benefits. She varies her amount of smoking per day but believes due to the number of years of smoking that she has smoked at least 20 pack years.  - Prof fee: Shared Decision Making for Lung Cancer Screening  - CT Chest Lung Cancer Scrn Low Dose wo  - Radiology Referral (Affiliate Use Only)     Patient has been advised of split billing requirements and indicates understanding: Yes      Counseling  Reviewed  preventive health counseling, as reflected in patient instructions       Regular exercise       Healthy diet/nutrition        She reports that she has quit smoking. Her smoking use included cigarettes. She has a 15 pack-year smoking history. She has been exposed to tobacco smoke. She has never used smokeless tobacco.  Nicotine/Tobacco Cessation Plan  She has quit smoking          Signed Electronically by: Marylu Garcia MDLung Cancer Screening Shared Decision Making Visit     Susie Santana, a 53 year old female, is eligible for lung cancer screening    History   Smoking Status    Former    Types: Cigarettes   Smokeless Tobacco    Never       I have discussed with patient the risks and benefits of screening for lung cancer with low-dose CT.     The risks include:    radiation exposure: one low dose chest CT has as much ionizing radiation as about 15 chest x-rays, or 6 months of background radiation living in [place]      false positives: most findings/nodules are NOT cancer, but some might still require additional diagnostic evaluation, including biopsy    over-diagnosis: some slow growing cancers that might never have been clinically significant will be detected and treated unnecessarily     The benefit of early detection of lung cancer is contingent upon adherence to annual screening or more frequent follow up if indicated.     Furthermore, to benefit from screening, Susie must be willing and able to undergo diagnostic procedures, if indicated. Although no specific guide is available for determining severity of comorbidities, it is reasonable to withhold screening in patients who have greater mortality risk from other diseases.     We did discuss that the best way to prevent lung cancer is to not smoke.    Some patients may value a numeric estimation of lung cancer risk when evaluating if lung cancer screening is right for them, here is one calculator:    ShouldIScreen

## 2024-07-08 ENCOUNTER — OFFICE VISIT (OUTPATIENT)
Dept: FAMILY MEDICINE | Facility: CLINIC | Age: 54
End: 2024-07-08

## 2024-07-08 ENCOUNTER — TRANSFERRED RECORDS (OUTPATIENT)
Dept: FAMILY MEDICINE | Facility: CLINIC | Age: 54
End: 2024-07-08

## 2024-07-08 VITALS
OXYGEN SATURATION: 98 % | HEART RATE: 91 BPM | WEIGHT: 127 LBS | TEMPERATURE: 98.2 F | BODY MASS INDEX: 21.16 KG/M2 | HEIGHT: 65 IN | SYSTOLIC BLOOD PRESSURE: 156 MMHG | DIASTOLIC BLOOD PRESSURE: 92 MMHG

## 2024-07-08 DIAGNOSIS — R03.0 ELEVATED BLOOD PRESSURE READING WITHOUT DIAGNOSIS OF HYPERTENSION: ICD-10-CM

## 2024-07-08 DIAGNOSIS — Z00.00 ENCOUNTER FOR ROUTINE HISTORY AND PHYSICAL EXAM IN FEMALE PATIENT: Primary | ICD-10-CM

## 2024-07-08 DIAGNOSIS — Z12.11 SCREEN FOR COLON CANCER: ICD-10-CM

## 2024-07-08 DIAGNOSIS — Z87.891 PERSONAL HISTORY OF TOBACCO USE: ICD-10-CM

## 2024-07-08 DIAGNOSIS — Z87.891 PERSONAL HISTORY OF TOBACCO USE, PRESENTING HAZARDS TO HEALTH: ICD-10-CM

## 2024-07-08 DIAGNOSIS — Z12.31 ENCOUNTER FOR SCREENING MAMMOGRAM FOR BREAST CANCER: ICD-10-CM

## 2024-07-08 DIAGNOSIS — F41.9 ANXIETY: ICD-10-CM

## 2024-07-08 PROCEDURE — 80048 BASIC METABOLIC PNL TOTAL CA: CPT | Performed by: FAMILY MEDICINE

## 2024-07-08 PROCEDURE — 99213 OFFICE O/P EST LOW 20 MIN: CPT | Mod: 25 | Performed by: FAMILY MEDICINE

## 2024-07-08 PROCEDURE — G0296 VISIT TO DETERM LDCT ELIG: HCPCS | Performed by: FAMILY MEDICINE

## 2024-07-08 PROCEDURE — 90715 TDAP VACCINE 7 YRS/> IM: CPT | Performed by: FAMILY MEDICINE

## 2024-07-08 PROCEDURE — 36415 COLL VENOUS BLD VENIPUNCTURE: CPT | Performed by: FAMILY MEDICINE

## 2024-07-08 PROCEDURE — 99396 PREV VISIT EST AGE 40-64: CPT | Mod: 25 | Performed by: FAMILY MEDICINE

## 2024-07-08 PROCEDURE — 80061 LIPID PANEL: CPT | Mod: 90 | Performed by: FAMILY MEDICINE

## 2024-07-08 PROCEDURE — 90471 IMMUNIZATION ADMIN: CPT | Performed by: FAMILY MEDICINE

## 2024-07-08 RX ORDER — LISINOPRIL/HYDROCHLOROTHIAZIDE 10-12.5 MG
1 TABLET ORAL DAILY
Qty: 30 TABLET | Refills: 0 | Status: SHIPPED | OUTPATIENT
Start: 2024-07-08 | End: 2024-07-24

## 2024-07-08 NOTE — LETTER
July 12, 2024      Susie Santana  [id number] [facility] [place] [address]        Dear ,    We are writing to inform you of your test results.  Your labs: kidney function is ok but slightly low sodium.  Your lipids are a little high. Continue to work on healthy diet and regular exercise. Recheck in clinic in 6-12 mo.    Resulted Orders   Basic Metabolic Panel (BFP)   Result Value Ref Range    Carbon Dioxide 26.9 20 - 32 mmol/L    Creatinine 0.79 0.60 - 1.30 mg/dL    Glucose 86 60 - 99 mg/dL    Sodium 134.0 (A) 135 - 146 mmol/L    Potassium 4.04 3.5 - 5.3 mmol/L    Chloride 96.9 (A) 98 - 110 mmol/L    Urea Nitrogen 9 7 - 25 mg/dL    Calcium 9.9 8.6 - 10.3 mg/dL    BUN/Creatinine Ratio 11 6 - 32   Lipid Profile ([facility])   Result Value Ref Range    Cholesterol 273 (H) <200 mg/dL    HDL Cholesterol 141 > OR = 50 mg/dL    Triglycerides 47 <150 mg/dL    LDL Cholesterol Calculated 118 (H) mg/dL (calc)      Comment:      Reference range: <100     Desirable range <100 mg/dL for primary prevention;    <70 mg/dL for patients with CHD or diabetic patients   with > or = 2 CHD risk factors.     LDL-C is now calculated using the Dillon-Harini   calculation, which is a validated novel method providing   better accuracy than the Friedewald equation in the   estimation of LDL-C.   Dillon MANDUJANO et al. GUS. 2013;310(19): 8438-5980   (http://education.[employer].Virtual Solutions/faq/VYE583)      Cholesterol/HDL Ratio 1.9 <5.0 (calc)    Non HDL Cholesterol 132 (H) <130 mg/dL (calc)      Comment:      For patients with diabetes plus 1 major ASCVD risk   factor, treating to a non-HDL-C goal of <100 mg/dL   (LDL-C of <70 mg/dL) is considered a therapeutic   option.         If you have any questions or concerns, please call the clinic at the number listed above.       Sincerely,      Marylu Garcia MD

## 2024-07-08 NOTE — PATIENT INSTRUCTIONS
Lung Cancer Screening   Frequently Asked Questions  If you are at high-risk for lung cancer, getting screened with low-dose computed tomography (LDCT) every year can help save your life. This handout offers answers to some of the most common questions about lung cancer screening. If you have other questions, please call [phone] ([phone]).     What is it?  Lung cancer screening uses special X-ray technology to create an image of your lung tissue. The exam is quick and easy and takes less than 10 seconds. We don t give you any medicine or use any needles. You can eat before and after the exam. You don t need to change your clothes as long as the clothing on your chest doesn t contain metal. But, you do need to be able to hold your breath for at least 6 seconds during the exam.    What is the goal of lung cancer screening?  The goal of lung cancer screening is to save lives. Many times, lung cancer is not found until a person starts having physical symptoms. Lung cancer screening can help detect lung cancer in the earliest stages when it may be easier to treat.    Who should be screened for lung cancer?  We suggest lung cancer screening for anyone who is at high-risk for lung cancer. You are in the high-risk group if you:      are between the ages of 55 and 79, and    have smoked at least 1 pack of cigarettes a day for 20 or more years, and    still smoke or have quit within the past 15 years.    However, if you have a new cough or shortness of breath, you should talk to your doctor before being screened.    Why does it matter if I have symptoms?  Certain symptoms can be a sign that you have a condition in your lungs that should be checked and treated by your doctor. These symptoms include fever, chest pain, a new or changing cough, shortness of breath that you have never felt before, coughing up blood or unexplained weight loss. Having any of these symptoms can greatly affect the results of lung  cancer screening.       Should all smokers get an LDCT lung cancer screening exam?  It depends. Lung cancer screening is for a very specific group of men and women who have a history of heavy smoking over a long period of time (see  Who should be screened for lung cancer  above).  I am in the high-risk group, but have been diagnosed with cancer in the past. Is LDCT lung cancer screening right for me?  In some cases, you should not have LDCT lung screening, such as when your doctor is already following your cancer with CT scan studies. Your doctor will help you decide if LDCT lung screening is right for you.  Do I need to have a screening exam every year?  Yes. If you are in the high-risk group described earlier, you should get an LDCT lung cancer screening exam every year until you are 79, or are no longer willing or able to undergo screening and possible procedures to diagnose and treat lung cancer.  How effective is LDCT at preventing death from lung cancer?  Studies have shown that LDCT lung cancer screening can lower the risk of death from lung cancer by 20 percent in people who are at high-risk.  What are the risks?  There are some risks and limitations of LDCT lung cancer screening. We want to make sure you understand the risks and benefits, so please let us know if you have any questions. Your doctor may want to talk with you more about these risks.    Radiation exposure: As with any exam that uses radiation, there is a very small increased risk of cancer. The amount of radiation in LDCT is small--about the same amount a person would get from a mammogram. Your doctor orders the exam when he or she feels the potential benefits outweigh the risks.    False negatives: No test is perfect, including LDCT. It is possible that you may have a medical condition, including lung cancer, that is not found during your exam. This is called a false negative result.    False positives and more testing: LDCT very often finds  something in the lung that could be cancer, but in fact is not. This is called a false positive result. False positive tests often cause anxiety. To make sure these findings are not cancer, you may need to have more tests. These tests will be done only if you give us permission. Sometimes patients need a treatment that can have side effects, such as a biopsy. For more information on false positives, see  What can I expect from the results?     Findings not related to lung cancer: Your LDCT exam also takes pictures of areas of your body next to your lungs. In a very small number of cases, the CT scan will show an abnormal finding in one of these areas, such as your kidneys, adrenal glands, liver or thyroid. This finding may not be serious, but you may need more tests. Your doctor can help you decide what other tests you may need, if any.  What can I expect from the results?  About 1 out of 4 LDCT exams will find something that may need more tests. Most of the time, these findings are lung nodules. Lung nodules are very small collections of tissue in the lung. These nodules are very common, and the vast majority--more than 97 percent--are not cancer (benign). Most are normal lymph nodes or small areas of scarring from past infections.  But, if a small lung nodule is found to be cancer, the cancer can be cured more than 90 percent of the time. To know if the nodule is cancer, we may need to get more images before your next yearly screening exam. If the nodule has suspicious features (for example, it is large, has an odd shape or grows over time), we will refer you to a specialist for further testing.  Will my doctor also get the results?  Yes. Your doctor will get a copy of your results.  Is it okay to keep smoking now that there s a cancer screening exam?  No. Tobacco is one of the strongest cancer-causing agents. It causes not only lung cancer, but other cancers and cardiovascular (heart) diseases as well. The damage  caused by smoking builds over time. This means that the longer you smoke, the higher your risk of disease. While it is never too late to quit, the sooner you quit, the better.  Where can I find help to quit smoking?  The best way to prevent lung cancer is to stop smoking. If you have already quit smoking, congratulations and keep it up! For help on quitting smoking, please call [employer] at 1-398-QUITNOW (1-629.968.3071) or the American [employer] Society at 1-948.643.6914 to find local resources near you.  One-on-one health coaching:  If you d prefer to work individually with a health care provider on tobacco cessation, we offer:      Medication Therapy Management:  Our specially trained pharmacists work closely with you and your doctor to help you quit smoking.  Call [phone] or [phone] (toll free).

## 2024-07-08 NOTE — NURSING NOTE
Chief Complaint   Patient presents with    Physical     Fasting today, she [facility]     Hypertension     Elevated BP recently, she noticed this has been elevated since she stopped smoking      Pre-visit Screening:  Immunizations:  not up to date - tdap today, shingrix at pharmacy  Colonoscopy:  is due and ordered today  Mammogram: is due and ordered today  Asthma Action Test/Plan:  NA  PHQ9:  NA  GAD7:  NA  Questioned patient about current smoking habits Pt. quit smoking some time ago.  Ok to leave detailed message on voice mail for today's visit only Yes, phone # [phone]

## 2024-07-09 LAB
BUN SERPL-MCNC: 9 MG/DL (ref 7–25)
BUN/CREATININE RATIO: 11 (ref 6–32)
CALCIUM SERPL-MCNC: 9.9 MG/DL (ref 8.6–10.3)
CHLORIDE SERPLBLD-SCNC: 96.9 MMOL/L (ref 98–110)
CO2 SERPL-SCNC: 26.9 MMOL/L (ref 20–32)
CREAT SERPL-MCNC: 0.79 MG/DL (ref 0.6–1.3)
GLUCOSE SERPL-MCNC: 86 MG/DL (ref 60–99)
POTASSIUM SERPL-SCNC: 4.04 MMOL/L (ref 3.5–5.3)
SODIUM SERPL-SCNC: 134 MMOL/L (ref 135–146)

## 2024-07-10 LAB
CHOLEST SERPL-MCNC: 273 MG/DL
CHOLEST/HDLC SERPL: 1.9 (CALC)
HDLC SERPL-MCNC: 141 MG/DL
LDLC SERPL CALC-MCNC: 118 MG/DL (CALC)
NONHDLC SERPL-MCNC: 132 MG/DL (CALC)
TRIGL SERPL-MCNC: 47 MG/DL

## 2024-07-23 ENCOUNTER — TELEPHONE (OUTPATIENT)
Dept: FAMILY MEDICINE | Facility: CLINIC | Age: 54
End: 2024-07-23

## 2024-07-23 NOTE — TELEPHONE ENCOUNTER
Pt called stating she has been on lisinopril-hydrochlorothiazide for the last 1.5 weeks. Since starting this medication she has had a headache and nagging cough. She is wondering if an alternative can be sent in to try.    Please advise # [phone]    Thanks, Génesis MOREIRA

## 2024-07-24 RX ORDER — LOSARTAN POTASSIUM 25 MG/1
25 TABLET ORAL DAILY
Qty: 30 TABLET | Refills: 0 | Status: SHIPPED | OUTPATIENT
Start: 2024-07-24 | End: 2024-08-12

## 2024-07-24 RX ORDER — HYDROCHLOROTHIAZIDE 12.5 MG/1
12.5 TABLET ORAL DAILY
Qty: 30 TABLET | Refills: 0 | Status: SHIPPED | OUTPATIENT
Start: 2024-07-24 | End: 2024-07-29

## 2024-07-24 NOTE — PROGRESS NOTES
"Assessment & Plan   Problem List Items Addressed This Visit          Behavioral    Personal history of tobacco use, presenting hazards to health - Primary     Other Visit Diagnoses       Essential hypertension        Seizure disorder (H)        Relevant Orders    Adult Neurology  Referral - To a [facility] Location (Use POS/Location)    Comprehensive Metobolic Panel (BFP) (Completed)    HEMOGRAM PLATELET DIFF (BFP) (Completed)    VENOUS COLLECTION (Completed)    CT Head w/o Contrast    Radiology Referral (Affiliate Use Only)    XR Chest 2 Views (Completed)           1. Personal history of tobacco use, presenting hazards to health  Advised to quit. She said she is still vaping.    2. Essential hypertension  On medications. J-high, she has stopped the lisinopril, started hydrochlorothiazide. Stopped hydrochlorothiazide. I advised to change to amlodipine but she wants to continue first with the losartan.    3. Seizure disorder (H)  Unclear by her history. I advised to be seen in the ER, she said she feels ok. I will refer for additional evaluation to see neurology. Also do head ct. Chest xray negative.  - Adult Neurology  Referral - To a [facility] Location (Use POS/Location)  - Comprehensive Metobolic Panel (BFP)  - HEMOGRAM PLATELET DIFF (BFP)  - VENOUS COLLECTION  - CT Head w/o Contrast  - Radiology Referral (Affiliate Use Only)  - XR Chest 2 Views              FUTURE APPOINTMENTS:       - Follow-up visit immediately in the ER if she has another episode. I will do additional workup.     No follow-ups on file.    Marylu Garcia MD  [facility]    Subjective     Nursing Notes:   Génesis Conrad CMA  7/29/2024  2:51 PM  Signed  Chief Complaint   Patient presents with    RECHECK     Recheck BP, when on lisinopril-hydrochlorothiazide she developed a nagging cough and headaches, she stopped lisinopril last Sunday, she had a\"mini seizure\" that lasted a few " "seconds, she started losartan yesterday, BP has been still high      Pre-visit Screening:  Immunizations:  not up to date - shingrix at pharmacy  Colonoscopy:  is due and to be scheduled by patient for later completion  Mammogram: is up to date  Asthma Action Test/Plan:  VI  PHQ9:  NA  GAD7:  NA  Questioned patient about current smoking habits Pt. quit smoking some time ago.  Ok to leave detailed message on voice mail for today's visit only Yes, phone # [phone]       Susie Santana is a 53 year old female who presents to clinic today for the following health issues HPI     Was put on lisinopril for a week, bad cough and headache.    Her  is a , she dropped and fell to the ground, he caught her and her eyes were dilated. He thought it was a seizure.     She had a procedure done at least 3-4 months ago before her blood pressure went up. This was to help her breathe better. It was done through [facility]". Inferior turbinate reduction and balloon sinuplasty. But her blood pressure was high after this procedure. She isn't sure if this is related.    Now she is on losartan and hydrochlorothiazide, started yesterday and not bothering her.    Doesn't think due to alcohol or drug use. This day it happened was a Sunday, had 6 drinks but she ate that day. Says she doesn't necessarily drink every day.  Used to smoke, now she uses vape. Then she got lightheaded when she came back In. No chest pain, no heart racing.        Review of Systems   Constitutional, HEENT, cardiovascular, pulmonary, gi and gu systems are negative, except as otherwise noted.      Objective    BP (!) 142/82 (BP Location: Right arm, Patient Position: Sitting, Cuff Size: Adult Regular)   Pulse 95   Temp 97.8  F (36.6  C) (Temporal)   Wt 58.5 kg (129 lb)   SpO2 96%   BMI 21.47 kg/m    Body mass index is 21.47 kg/m .  Physical Exam   GENERAL: alert and no distress  RESP: lungs clear to auscultation - no rales, rhonchi or wheezes  CV: " regular rate and rhythm, normal S1 S2, no S3 or S4, no murmur, click or rub, no peripheral edema  MS: no gross musculoskeletal defects noted, no edema  NEURO: Normal strength and tone, mentation intact and speech normal  PSYCH: mentation appears normal, affect normal/bright    Results for orders placed or performed in visit on 07/29/24   XR Chest 2 Views     Status: None    Narrative    Radiologist Consultation/:   Fax:  [phone]  [phone]  _____________________________________________________________________________________________________________________________________________________________________________________________________________________________________________________________________________________________________________________________________________________________________________________________________________________________________________________________________________________________________  PATIENT NAME: LANDRU, WOOD A  YOB: 1970 Age: 53 ACCESSION NUMBER: [id number]  SEX: F ORDERING PROVIDER: Marylu Jose  FACILITY: [facility] PRIMARY PROVIDER:  PATIENT ID: [id number] INTERESTED PARTY:  Page 1 of 1  _________________________________________________________________________________________________________________________________________________________________________________________________________________________________________________________________________________________________________________________________________________________________________________________  EXAM: XRAY CHEST 2 VIEW PA LATERAL  LOCATION: [facility]  DATE: 7/29/2024  INDICATION: Seizure disorder.  COMPARISON: None available.  IMPRESSION: PA and lateral views of the chest were obtained. Cardiomediastinal silhouette is within normal  limits. No suspicious focal pulmonary opacities. No significant pleural effusion or  pneumothorax.  SIGNED BY: Fernando Harrison MD 7/30/2024 1:41 AM   Comprehensive Metobolic Panel (BFP)     Status: Abnormal   Result Value Ref Range    Carbon Dioxide 30.2 20 - 32 mmol/L    Creatinine 0.57 (A) 0.60 - 1.30 mg/dL    Glucose 101 (A) 60 - 99 mg/dL    Sodium 132.5 (A) 135 - 146 mmol/L    Potassium 3.11 (A) 3.5 - 5.3 mmol/L    Chloride 91.9 (A) 98 - 110 mmol/L    Protein Total 7.2 6.1 - 8.1 g/dL    Albumin 4.5 3.6 - 5.1 g/dL    Alkaline Phosphatase 90 33 - 130 U/L    ALT 58 (A) 0 - 32 U/L    AST 54 (A) 0 - 35 U/L    Bilirubin Total 0.8 0.2 - 1.2 mg/dL    Urea Nitrogen 7 7 - 25 mg/dL    Calcium 9.3 8.6 - 10.3 mg/dL    BUN/Creatinine Ratio 12 6 - 32   HEMOGRAM PLATELET DIFF (BFP)     Status: Abnormal   Result Value Ref Range    WBC 6.3 4.0 - 11 10*9/L    RBC Count 3.40 (A) 3.8 - 5.2 10*12/L    Hemoglobin 12.1 11.7 - 15.7 g/dL    Hematocrit 37.3 35.0 - 47.0 %    .7 (A) 78 - 100 fL    MCH 35.6 (A) 26 - 33 pg    MCHC 32.4 31 - 36 g/dL    Platelet Count 398 (A) 150 - 375 10^9/L    % Granulocytes 58.4 %    % Lymphocytes 29.2 %    % Monocytes 12.4 %    Narrative    Ran twice        Chest xray pa and lateral personally reviewed by me, negative.

## 2024-07-24 NOTE — TELEPHONE ENCOUNTER
I stopped the lisinopril/hydrochlorothiazide and sent over instead losartan 25 and hydrochlorothiazide 12.5

## 2024-07-29 ENCOUNTER — OFFICE VISIT (OUTPATIENT)
Dept: FAMILY MEDICINE | Facility: CLINIC | Age: 54
End: 2024-07-29

## 2024-07-29 VITALS
WEIGHT: 129 LBS | DIASTOLIC BLOOD PRESSURE: 82 MMHG | TEMPERATURE: 97.8 F | SYSTOLIC BLOOD PRESSURE: 142 MMHG | HEART RATE: 95 BPM | BODY MASS INDEX: 21.47 KG/M2 | OXYGEN SATURATION: 96 %

## 2024-07-29 DIAGNOSIS — Z87.891 PERSONAL HISTORY OF TOBACCO USE, PRESENTING HAZARDS TO HEALTH: Primary | ICD-10-CM

## 2024-07-29 DIAGNOSIS — I10 ESSENTIAL HYPERTENSION: ICD-10-CM

## 2024-07-29 DIAGNOSIS — G40.909 SEIZURE DISORDER (H): ICD-10-CM

## 2024-07-29 LAB
% GRANULOCYTES: 58.4 %
HCT VFR BLD AUTO: 37.3 % (ref 35–47)
HEMOGLOBIN: 12.1 G/DL (ref 11.7–15.7)
LYMPHOCYTES NFR BLD AUTO: 29.2 %
MCH RBC QN AUTO: 35.6 PG (ref 26–33)
MCHC RBC AUTO-ENTMCNC: 32.4 G/DL (ref 31–36)
MCV RBC AUTO: 109.7 FL (ref 78–100)
MONOCYTES NFR BLD AUTO: 12.4 %
PLATELET COUNT - QUEST: 398 10^9/L (ref 150–375)
RBC # BLD AUTO: 3.4 10*12/L (ref 3.8–5.2)
WBC # BLD AUTO: 6.3 10*9/L (ref 4–11)

## 2024-07-29 PROCEDURE — 71046 X-RAY EXAM CHEST 2 VIEWS: CPT | Performed by: FAMILY MEDICINE

## 2024-07-29 PROCEDURE — 85025 COMPLETE CBC W/AUTO DIFF WBC: CPT | Performed by: FAMILY MEDICINE

## 2024-07-29 PROCEDURE — 80053 COMPREHEN METABOLIC PANEL: CPT | Performed by: FAMILY MEDICINE

## 2024-07-29 PROCEDURE — 36415 COLL VENOUS BLD VENIPUNCTURE: CPT | Performed by: FAMILY MEDICINE

## 2024-07-29 PROCEDURE — 99214 OFFICE O/P EST MOD 30 MIN: CPT | Performed by: FAMILY MEDICINE

## 2024-07-29 NOTE — NURSING NOTE
"Chief Complaint   Patient presents with    RECHECK     Recheck BP, when on lisinopril-hydrochlorothiazide she developed a nagging cough and headaches, she stopped lisinopril last Sunday, she had a\"mini seizure\" that lasted a few seconds, she started losartan yesterday, BP has been still high      Pre-visit Screening:  Immunizations:  not up to date - shingrix at pharmacy  Colonoscopy:  is due and to be scheduled by patient for later completion  Mammogram: is up to date  Asthma Action Test/Plan:  VI  PHQ9:  NA  GAD7:  NA  Questioned patient about current smoking habits Pt. quit smoking some time ago.  Ok to leave detailed message on voice mail for today's visit only Yes, phone # [phone]    "

## 2024-07-30 LAB
ALBUMIN SERPL-MCNC: 4.5 G/DL (ref 3.6–5.1)
ALP SERPL-CCNC: 90 U/L (ref 33–130)
ALT 1742-6: 58 U/L (ref 0–32)
AST 1920-8: 54 U/L (ref 0–35)
BILIRUB SERPL-MCNC: 0.8 MG/DL (ref 0.2–1.2)
BUN SERPL-MCNC: 7 MG/DL (ref 7–25)
BUN/CREATININE RATIO: 12 (ref 6–32)
CALCIUM SERPL-MCNC: 9.3 MG/DL (ref 8.6–10.3)
CHLORIDE SERPLBLD-SCNC: 91.9 MMOL/L (ref 98–110)
CO2 SERPL-SCNC: 30.2 MMOL/L (ref 20–32)
CREAT SERPL-MCNC: 0.57 MG/DL (ref 0.6–1.3)
GLUCOSE SERPL-MCNC: 101 MG/DL (ref 60–99)
POTASSIUM SERPL-SCNC: 3.11 MMOL/L (ref 3.5–5.3)
PROT SERPL-MCNC: 7.2 G/DL (ref 6.1–8.1)
SODIUM SERPL-SCNC: 132.5 MMOL/L (ref 135–146)

## 2024-08-09 NOTE — PROGRESS NOTES
Assessment & Plan   Problem List Items Addressed This Visit          Other    Elevated blood pressure reading without diagnosis of hypertension    Relevant Medications    losartan (COZAAR) 25 MG tablet    Other Relevant Orders    VENOUS COLLECTION (Completed)    Basic Metabolic Panel (BFP)    VENOUS COLLECTION     Other Visit Diagnoses       Hyponatremia    -  Primary    Relevant Orders    VENOUS COLLECTION (Completed)    Basic Metabolic Panel (BFP)    VENOUS COLLECTION    Elevated liver function tests        Relevant Orders    Radiology Referral (Affiliate Use Only)    US Abdomen Complete           1. Elevated blood pressure reading without diagnosis of hypertension  Controlled on medications. Check labs. Continue. No problems with the med.  - losartan (COZAAR) 25 MG tablet; Take 1 tablet (25 mg) by mouth daily  Dispense: 90 tablet; Refill: 0  - VENOUS COLLECTION  - Basic Metabolic Panel (BFP); Future  - VENOUS COLLECTION; Future    2. Hyponatremia  Recheck today.  - VENOUS COLLECTION  - Basic Metabolic Panel (BFP); Future  - VENOUS COLLECTION; Future    3. Elevated liver function tests  Ultrasound abdomen.  - Radiology Referral (Affiliate Use Only)  - US Abdomen Complete              FUTURE APPOINTMENTS:       - Follow-up visit in 1 month. We will continue to manage her chronic medical care.    No follow-ups on file.    Marylu Garcia MD  [facility]    Subjective     Nursing Notes:   Elizabeth Buckley MA  8/12/2024  1:48 PM  Signed  Chief Complaint   Patient presents with    Recheck Medication     F/u on abnormal labs      Pre-visit Screening:  Immunizations:  not up to date -  needs to get shingles   Colonoscopy:  is due and to be scheduled by patient for later completion  Mammogram: na  Asthma Action Test/Plan:na  PHQ9:  na  GAD7:  na  Questioned patient about current smoking habits Pt. vaping  Ok to leave detailed message on voice mail for today's visit only yes, phone #  [phone]        Susie Santana is a 53 year old female who presents to clinic today for the following health issues   HPI     Sleeping better and hot flashes are better. Since she's been on the losartan.    Possible seizure, didn't go in to the Er. But had head scan--negative. Chest ct lung cancer screen negative.  Referred to see neurology for possible seizure but hasn't scheduled with them yet.  Labs: liver functions were high. She has drank more alcohol but is trying to cut back.        Review of Systems   Constitutional, HEENT, cardiovascular, pulmonary, gi and gu systems are negative, except as otherwise noted.      Objective    /84 (BP Location: Left arm, Patient Position: Sitting, Cuff Size: Adult Regular)   Pulse 96   Temp 97.8  F (36.6  C) (Oral)   Wt 58.7 kg (129 lb 6.4 oz)   SpO2 97%   BMI 21.53 kg/m    Body mass index is 21.53 kg/m .  Physical Exam   GENERAL: alert and no distress  MS: no gross musculoskeletal defects noted, no edema  NEURO: Normal strength and tone, mentation intact and speech normal  PSYCH: mentation appears normal, affect normal/bright    No results found for any visits on 08/12/24.

## 2024-08-12 ENCOUNTER — OFFICE VISIT (OUTPATIENT)
Dept: FAMILY MEDICINE | Facility: CLINIC | Age: 54
End: 2024-08-12

## 2024-08-12 VITALS
WEIGHT: 129.4 LBS | TEMPERATURE: 97.8 F | HEART RATE: 96 BPM | DIASTOLIC BLOOD PRESSURE: 84 MMHG | OXYGEN SATURATION: 97 % | BODY MASS INDEX: 21.53 KG/M2 | SYSTOLIC BLOOD PRESSURE: 124 MMHG

## 2024-08-12 DIAGNOSIS — R03.0 ELEVATED BLOOD PRESSURE READING WITHOUT DIAGNOSIS OF HYPERTENSION: ICD-10-CM

## 2024-08-12 DIAGNOSIS — R79.89 ELEVATED LIVER FUNCTION TESTS: ICD-10-CM

## 2024-08-12 DIAGNOSIS — E87.1 HYPONATREMIA: Primary | ICD-10-CM

## 2024-08-12 PROCEDURE — G2211 COMPLEX E/M VISIT ADD ON: HCPCS | Performed by: FAMILY MEDICINE

## 2024-08-12 PROCEDURE — 99214 OFFICE O/P EST MOD 30 MIN: CPT | Performed by: FAMILY MEDICINE

## 2024-08-12 RX ORDER — MIRTAZAPINE 7.5 MG/1
7.5 TABLET, FILM COATED ORAL AT BEDTIME
COMMUNITY

## 2024-08-12 RX ORDER — LOSARTAN POTASSIUM 25 MG/1
25 TABLET ORAL DAILY
Qty: 90 TABLET | Refills: 0 | Status: SHIPPED | OUTPATIENT
Start: 2024-08-12

## 2024-08-12 NOTE — NURSING NOTE
Chief Complaint   Patient presents with    Recheck Medication     F/u on abnormal labs      Pre-visit Screening:  Immunizations:  not up to date -  needs to get shingles   Colonoscopy:  is due and to be scheduled by patient for later completion  Mammogram: na  Asthma Action Test/Plan:na  PHQ9:  na  GAD7:  na  Questioned patient about current smoking habits Pt. vaping  Ok to leave detailed message on voice mail for today's visit only yes, phone #  [phone]

## 2024-09-19 ENCOUNTER — HOSPITAL ENCOUNTER (OUTPATIENT)
Dept: MAMMOGRAPHY | Facility: CLINIC | Age: 54
Discharge: HOME | End: 2024-09-19
Attending: FAMILY MEDICINE | Admitting: FAMILY MEDICINE
Payer: COMMERCIAL

## 2024-09-19 DIAGNOSIS — Z12.31 ENCOUNTER FOR SCREENING MAMMOGRAM FOR BREAST CANCER: ICD-10-CM

## 2024-09-19 PROCEDURE — 77063 BREAST TOMOSYNTHESIS BI: CPT

## 2024-09-26 ENCOUNTER — HOSPITAL ENCOUNTER (OUTPATIENT)
Dept: ULTRASOUND IMAGING | Facility: CLINIC | Age: 54
Discharge: HOME | End: 2024-09-26
Attending: FAMILY MEDICINE
Payer: COMMERCIAL

## 2024-09-26 ENCOUNTER — HOSPITAL ENCOUNTER (OUTPATIENT)
Dept: MAMMOGRAPHY | Facility: CLINIC | Age: 54
Discharge: HOME | End: 2024-09-26
Attending: FAMILY MEDICINE
Payer: COMMERCIAL

## 2024-09-26 DIAGNOSIS — R92.8 ABNORMAL MAMMOGRAM: ICD-10-CM

## 2024-09-26 PROCEDURE — 76642 ULTRASOUND BREAST LIMITED: CPT | Mod: RT

## 2024-09-26 PROCEDURE — 77065 DX MAMMO INCL CAD UNI: CPT | Mod: RT

## 2024-10-07 ENCOUNTER — TRANSFERRED RECORDS (OUTPATIENT)
Dept: FAMILY MEDICINE | Facility: CLINIC | Age: 54
End: 2024-10-07

## 2024-12-02 ENCOUNTER — OFFICE VISIT (OUTPATIENT)
Dept: FAMILY MEDICINE | Facility: CLINIC | Age: 54
End: 2024-12-02

## 2024-12-02 VITALS
TEMPERATURE: 97.8 F | DIASTOLIC BLOOD PRESSURE: 86 MMHG | WEIGHT: 132 LBS | HEART RATE: 100 BPM | SYSTOLIC BLOOD PRESSURE: 132 MMHG | BODY MASS INDEX: 21.97 KG/M2 | OXYGEN SATURATION: 99 %

## 2024-12-02 DIAGNOSIS — E87.6 HYPOKALEMIA: Primary | ICD-10-CM

## 2024-12-02 DIAGNOSIS — R79.89 ELEVATED LIVER FUNCTION TESTS: ICD-10-CM

## 2024-12-02 DIAGNOSIS — F10.20 ALCOHOL DEPENDENCE, DAILY USE (H): ICD-10-CM

## 2024-12-02 DIAGNOSIS — R03.0 ELEVATED BLOOD PRESSURE READING WITHOUT DIAGNOSIS OF HYPERTENSION: ICD-10-CM

## 2024-12-02 DIAGNOSIS — K12.0 APHTHOUS ULCER: ICD-10-CM

## 2024-12-02 LAB
ALBUMIN SERPL-MCNC: 4.6 G/DL (ref 3.6–5.1)
ALP SERPL-CCNC: 108 U/L (ref 33–130)
ALT 1742-6: 180 U/L (ref 0–32)
AST 1920-8: 239 U/L (ref 0–35)
BILIRUB SERPL-MCNC: 0.6 MG/DL (ref 0.2–1.2)
BUN SERPL-MCNC: 5 MG/DL (ref 7–25)
BUN/CREATININE RATIO: 8 (ref 6–32)
CALCIUM SERPL-MCNC: 9.2 MG/DL (ref 8.6–10.3)
CHLORIDE SERPLBLD-SCNC: 90.5 MMOL/L (ref 98–110)
CO2 SERPL-SCNC: 21 MMOL/L (ref 20–32)
CREAT SERPL-MCNC: 0.63 MG/DL (ref 0.6–1.3)
GLUCOSE SERPL-MCNC: 84 MG/DL (ref 60–99)
INR POINT OF CARE: 0.9 (ref 0.9–1.1)
POTASSIUM SERPL-SCNC: 4.14 MMOL/L (ref 3.5–5.3)
PROT SERPL-MCNC: 7.1 G/DL (ref 6.1–8.1)
SODIUM SERPL-SCNC: 129 MMOL/L (ref 135–146)

## 2024-12-02 PROCEDURE — 99214 OFFICE O/P EST MOD 30 MIN: CPT | Mod: 25 | Performed by: FAMILY MEDICINE

## 2024-12-02 PROCEDURE — 90471 IMMUNIZATION ADMIN: CPT | Performed by: FAMILY MEDICINE

## 2024-12-02 PROCEDURE — 90656 IIV3 VACC NO PRSV 0.5 ML IM: CPT | Performed by: FAMILY MEDICINE

## 2024-12-02 PROCEDURE — 80053 COMPREHEN METABOLIC PANEL: CPT | Performed by: FAMILY MEDICINE

## 2024-12-02 PROCEDURE — 85610 PROTHROMBIN TIME: CPT | Performed by: FAMILY MEDICINE

## 2024-12-02 PROCEDURE — 36415 COLL VENOUS BLD VENIPUNCTURE: CPT | Performed by: FAMILY MEDICINE

## 2024-12-02 RX ORDER — LOSARTAN POTASSIUM 25 MG/1
25 TABLET ORAL DAILY
Qty: 90 TABLET | Refills: 1 | Status: SHIPPED | OUTPATIENT
Start: 2024-12-02

## 2024-12-02 NOTE — NURSING NOTE
Chief Complaint   Patient presents with    Recheck Medication     Non-fasting today, refill losartan     Pre-visit Screening:  Immunizations:  up to date  Colonoscopy:  is up to date  Mammogram: is up to date  Asthma Action Test/Plan:  NA  PHQ9:  NA  GAD7:  NA  Questioned patient about current smoking habits Pt. quit smoking some time ago.  Ok to leave detailed message on voice mail for today's visit only Yes, phone # [phone]

## 2024-12-02 NOTE — PROGRESS NOTES
Assessment & Plan   Problem List Items Addressed This Visit       Elevated blood pressure reading without diagnosis of hypertension    Relevant Medications    losartan (COZAAR) 25 MG tablet    Other Relevant Orders    INFLUENZA VACCINE, SPLIT VIRUS, TRIVALENT,PF (FLUZONE\FLUARIX) (Completed)     Other Visit Diagnoses       Hypokalemia    -  Primary    Relevant Orders    Comprehensive Metobolic Panel (BFP) (Completed)    VENOUS COLLECTION (Completed)    Elevated liver function tests        Relevant Orders    Radiology Referral (Affiliate Use Only)    US Abdomen Complete    PROTHROMBIN TIME INR (BFP) (Completed)    Aphthous ulcer               1. Elevated blood pressure reading without diagnosis of hypertension  Controlled on medications, refilled.  - losartan (COZAAR) 25 MG tablet; Take 1 tablet (25 mg) by mouth daily.  Dispense: 90 tablet; Refill: 1  - INFLUENZA VACCINE, SPLIT VIRUS, TRIVALENT,PF (FLUZONE\FLUARIX)    2. Hypokalemia (Primary)  Previously abnormal. I had requested that she come back in to go over labs, which she didn't do. Recheck today.  - Comprehensive Metobolic Panel (BFP)  - VENOUS COLLECTION    3. Elevated liver function tests  Previously high, she said that she drinks alcohol daily, advised to cut back. Recheck today, also do abdominal ultrasound then followup in clinic to discuss further.  - Radiology Referral (Affiliate Use Only)  - US Abdomen Complete  - PROTHROMBIN TIME INR (BFP)    4. Aphthous ulcer  Advised to treat symptomatically.       5. Alcohol dependence, daily use (H)  Advised to cut back.           FUTURE APPOINTMENTS:       - Follow-up visit after labs and ultrasound. We manage her chronic medical care.    No follow-ups on file.    Marylu Garcia MD  [facility]    Subjective     Nursing Notes:   Génesis Conrad CMA  12/2/2024  1:27 PM  Signed  Chief Complaint   Patient presents with    Recheck Medication     Non-fasting today, refill losartan     Pre-visit  Screening:  Immunizations:  up to date  Colonoscopy:  is up to date  Mammogram: is up to date  Asthma Action Test/Plan:  NA  PHQ9:  NA  GAD7:  NA  Questioned patient about current smoking habits Pt. quit smoking some time ago.  Ok to leave detailed message on voice mail for today's visit only Yes, phone # [phone]       Susie Santana is a 54 year old female who presents to clinic today for the following health issues   HPI     Here for a medication check for blood pressure. She is doing well on this due for a refill.    Previously abnormal kidney functions and liver functions. She was informed to come in for a recheck, she did not do this.    Alcohol use 3-4 drinks per day. White claw.    Lower lip swelling and redness. Hasn't had a cold sore like this.  Had a cold over the weekend. And had a cold last week.        Review of Systems   Constitutional, HEENT, cardiovascular, pulmonary, gi and gu systems are negative, except as otherwise noted.      Objective    /86 (BP Location: Right arm, Patient Position: Sitting, Cuff Size: Adult Large)   Pulse 100   Temp 97.8  F (36.6  C) (Temporal)   Wt 59.9 kg (132 lb)   SpO2 99%   BMI 21.97 kg/m    Body mass index is 21.97 kg/m .  Physical Exam   GENERAL: alert and no distress  RESP: lungs clear to auscultation - no rales, rhonchi or wheezes  CV: regular rate and rhythm, normal S1 S2, no S3 or S4, no murmur, click or rub, no peripheral edema  MS: no gross musculoskeletal defects noted, no edema  PSYCH: mentation appears normal, affect normal/bright    Results for orders placed or performed in visit on 12/02/24   Comprehensive Metobolic Panel (BFP)     Status: Abnormal   Result Value Ref Range    Carbon Dioxide 21.0 20 - 32 mmol/L    Creatinine 0.63 0.60 - 1.30 mg/dL    Glucose 84 60 - 99 mg/dL    Sodium 129.0 (A) 135 - 146 mmol/L    Potassium 4.14 3.5 - 5.3 mmol/L    Chloride 90.5 (A) 98 - 110 mmol/L    Protein Total 7.1 6.1 - 8.1 g/dL    Albumin 4.6 3.6 - 5.1  g/dL    Alkaline Phosphatase 108 33 - 130 U/L     (A) 0 - 32 U/L     (A) 0 - 35 U/L    Bilirubin Total 0.6 0.2 - 1.2 mg/dL    Urea Nitrogen 5 (A) 7 - 25 mg/dL    Calcium 9.2 8.6 - 10.3 mg/dL    BUN/Creatinine Ratio 8 6 - 32   PROTHROMBIN TIME INR (BFP)     Status: None   Result Value Ref Range    INR Point of Care 0.9 0.9 - 1.1

## 2024-12-02 NOTE — LETTER
December 3, 2024      Susie Santana  [id number] [facility] [id number]        Dear ,    We are writing to inform you of your test results.  Here are your lab test results. Your sodium is low and liver functions are again high. Be sure to do the ultrasound then schedule an appointment with me in clinic to go over the results.       Resulted Orders   Comprehensive Metobolic Panel (BFP)   Result Value Ref Range    Carbon Dioxide 21.0 20 - 32 mmol/L    Creatinine 0.63 0.60 - 1.30 mg/dL    Glucose 84 60 - 99 mg/dL    Sodium 129.0 (A) 135 - 146 mmol/L    Potassium 4.14 3.5 - 5.3 mmol/L    Chloride 90.5 (A) 98 - 110 mmol/L    Protein Total 7.1 6.1 - 8.1 g/dL    Albumin 4.6 3.6 - 5.1 g/dL    Alkaline Phosphatase 108 33 - 130 U/L     (A) 0 - 32 U/L     (A) 0 - 35 U/L    Bilirubin Total 0.6 0.2 - 1.2 mg/dL    Urea Nitrogen 5 (A) 7 - 25 mg/dL    Calcium 9.2 8.6 - 10.3 mg/dL    BUN/Creatinine Ratio 8 6 - 32   PROTHROMBIN TIME INR (BFP)   Result Value Ref Range    INR Point of Care 0.9 0.9 - 1.1       If you have any questions or concerns, please call the clinic at the number listed above.       Sincerely,      Marylu Garcia MD

## 2024-12-03 PROBLEM — R79.89 ELEVATED LIVER FUNCTION TESTS: Status: ACTIVE | Noted: 2024-12-03

## 2024-12-03 PROBLEM — F10.20 ALCOHOL DEPENDENCE, DAILY USE (H): Status: ACTIVE | Noted: 2024-12-03

## 2024-12-03 PROBLEM — E87.6 HYPOKALEMIA: Status: ACTIVE | Noted: 2024-12-03

## 2024-12-04 ENCOUNTER — TELEPHONE (OUTPATIENT)
Dept: FAMILY MEDICINE | Facility: CLINIC | Age: 54
End: 2024-12-04

## 2024-12-04 NOTE — TELEPHONE ENCOUNTER
Patient called into the CS line asking if she can have magic mouthwash sent into the pharmacy. She has sores in her mouth that are causing pain and she feels that magic mouthwash will help this. I did call the patient back and informed her that the only pharmacy around to get this is [facility] and informed her of where they are located in [place]. She said that will not work for her and I informed her that is the only medication they have for her concern. She had no further questions or concerns at this time.

## 2024-12-16 ENCOUNTER — OFFICE VISIT (OUTPATIENT)
Dept: FAMILY MEDICINE | Facility: CLINIC | Age: 54
End: 2024-12-16

## 2024-12-16 VITALS
OXYGEN SATURATION: 94 % | BODY MASS INDEX: 21.99 KG/M2 | SYSTOLIC BLOOD PRESSURE: 132 MMHG | HEART RATE: 84 BPM | WEIGHT: 132 LBS | RESPIRATION RATE: 20 BRPM | TEMPERATURE: 97.4 F | HEIGHT: 65 IN | DIASTOLIC BLOOD PRESSURE: 72 MMHG

## 2024-12-16 DIAGNOSIS — R21 RASH AND NONSPECIFIC SKIN ERUPTION: Primary | ICD-10-CM

## 2024-12-16 DIAGNOSIS — K12.0 APHTHOUS ULCER: ICD-10-CM

## 2024-12-16 PROCEDURE — 99213 OFFICE O/P EST LOW 20 MIN: CPT

## 2024-12-16 RX ORDER — ESCITALOPRAM OXALATE 20 MG/1
20 TABLET ORAL EVERY MORNING
COMMUNITY
Start: 2024-12-02 | End: 2024-12-16 | Stop reason: DRUGHIGH

## 2024-12-16 RX ORDER — PREDNISONE 20 MG/1
20 TABLET ORAL 2 TIMES DAILY
Qty: 10 TABLET | Refills: 0 | Status: SHIPPED | OUTPATIENT
Start: 2024-12-16 | End: 2024-12-21

## 2024-12-16 NOTE — NURSING NOTE
Susie Santana is here for her mouth sores, not better and has been ongoing for over 2 weeks. Starting last week noticed her hands peeling and sore. Also has red spots on hands    Questioned patient about current smoking habits.  Pt. quit smoking some time ago.  PULSE regular  My Chart: active  CLASSIFICATION OF OVERWEIGHT AND OBESITY BY BMI                        Obesity Class           BMI(kg/m2)  Underweight                                    < 18.5  Normal                                         18.5-24.9  Overweight                                     25.0-29.9  OBESITY                     I                  30.0-34.9                             II                 35.0-39.9  EXTREME OBESITY             III                >40                            Patient's  BMI Body mass index is 21.97 kg/m .  http://hin.nhlbi.nih.gov/menuplanner/menu.cgi  Pre-visit planning  Immunizations - up to date  Colonoscopy - is up to date  Mammogram - is up to date  Asthma -   PHQ9 -    DAVID-7 -      The patient has verbalized that it is ok to leave a detailed voice message on the patient's cell phone with results/recommendations from this visit.

## 2024-12-16 NOTE — PROGRESS NOTES
Assessment & Plan     1. Rash and nonspecific skin eruption (Primary)  - Unclear exact etiology of current symptoms, discussed likely due to viral URI, such as hand, foot, and mouth or another viral illness. Will try short course of Prednisone to help decrease inflammation and improve lip swelling and skin lesions on hands/feet. Side effects reviewed. Encouraged Susie to continue to push plenty of fluids, rest, and monitor symptoms. Return to clinic and ER precautions discussed.   - predniSONE (DELTASONE) 20 MG tablet; Take 1 tablet (20 mg) by mouth 2 times daily for 5 days.  Dispense: 10 tablet; Refill: 0    2. Aphthous ulcer  - Improving, continue to treat conservatively.     Follow up as needed. Reasons to follow-up sooner or seek emergent care reviewed.     Nadeen Evangelista PA-C  [facility]       Subjective     Susie Santana is a 54 year old female who presents to clinic today for the following health issues:    HPI   Chief Complaint   Patient presents with    Mouth/Lip Problem      Susie presents with concerns of ongoing mouth sores and notes now has noticed last week that she is having new red spots on her hands and legs. She notes initially she had symptoms of a sore throat and fever around thanksgiving time and then she started to have canker sores in the inside of her lower lip area and the back of her throat and since then everything has been getting worse. States she has no energy or appetite. States the canker sores in her mouth are improving however her lesions on her lips are painful and her lips in general are very dry and and swollen. Also notes over the weekend she noticed that she has red spots on both her hands and that there is some skin that is peeling off, also notes her fingertips are red and that her fingers are almost falling asleep. Also notes having similar red spots/lesions on her legs, does note she has a history of psoriasis which she follows for at [facility]  "[facility] but that it has never been this bad. Denies any itching or pain on the skin spots on her hands or legs. She denies any symptoms of a sore throat, is still having some low grade fevers and fatigue. Denies any other symptoms.     Daily medications reviewed.       Objective    /72 (BP Location: Right arm, Patient Position: Chair, Cuff Size: Adult Regular)   Pulse 84   Temp 97.4  F (36.3  C) (Temporal)   Resp 20   Ht 1.651 m (5' 5\")   Wt 59.9 kg (132 lb)   BMI 21.97 kg/m    Body mass index is 21.97 kg/m .    Physical Examination:  GENERAL: healthy, alert and no distress  EYES: Eyes grossly normal to inspection, PERRL and conjunctivae and sclerae normal  EARS: ear canals and TM's normal  NOSE: within normal limits  MOUTH: lips are slightly swollen, dry, and cracked, multiple healing canker sores noted in oropharynx, no posterior oropharyngeal, tonsils 2+  NECK: no adenopathy, no asymmetry, masses, or scars and thyroid normal to palpation  RESP: lungs clear to auscultation - no rales, rhonchi or wheezes  CV: regular rate and rhythm, normal S1 S2, no S3 or S4, no murmur, click or rub, no peripheral edema   ABDOMEN: soft and non-tender  MS: no gross musculoskeletal defects noted, no edema  SKIN: multiple red skin lesions noted on dorsal sides of both hands and on anterior/posterior sides of lower legs extending to mid calf area, fingertips on hands also noted to be red with peeling skin noted on fifth digit of left hand  PSYCH: mentation appears normal, affect normal/bright    "

## 2024-12-26 ENCOUNTER — HOSPITAL ENCOUNTER (EMERGENCY)
Facility: CLINIC | Age: 54
Discharge: HOME | End: 2024-12-26
Attending: EMERGENCY MEDICINE
Payer: COMMERCIAL

## 2024-12-26 VITALS
RESPIRATION RATE: 16 BRPM | OXYGEN SATURATION: 100 % | HEART RATE: 91 BPM | BODY MASS INDEX: 21.97 KG/M2 | DIASTOLIC BLOOD PRESSURE: 96 MMHG | HEIGHT: 65 IN | TEMPERATURE: 96.9 F | SYSTOLIC BLOOD PRESSURE: 145 MMHG | WEIGHT: 131.84 LBS

## 2024-12-26 DIAGNOSIS — E87.6 HYPOKALEMIA: ICD-10-CM

## 2024-12-26 DIAGNOSIS — R21 RASH AND NONSPECIFIC SKIN ERUPTION: ICD-10-CM

## 2024-12-26 LAB
ANION GAP SERPL CALCULATED.3IONS-SCNC: 15 MMOL/L (ref 7–15)
BASOPHILS # BLD AUTO: 0 10E3/UL (ref 0–0.2)
BASOPHILS NFR BLD AUTO: 1 %
BUN SERPL-MCNC: 4.3 MG/DL (ref 6–20)
CALCIUM SERPL-MCNC: 9 MG/DL (ref 8.8–10.4)
CHLORIDE SERPL-SCNC: 97 MMOL/L (ref 98–107)
CREAT SERPL-MCNC: 0.58 MG/DL (ref 0.51–0.95)
EGFRCR SERPLBLD CKD-EPI 2021: >90 ML/MIN/1.73M2
EOSINOPHIL # BLD AUTO: 0 10E3/UL (ref 0–0.7)
EOSINOPHIL NFR BLD AUTO: 1 %
ERYTHROCYTE [DISTWIDTH] IN BLOOD BY AUTOMATED COUNT: 12.3 % (ref 10–15)
GLUCOSE SERPL-MCNC: 96 MG/DL (ref 70–99)
HCO3 SERPL-SCNC: 26 MMOL/L (ref 22–29)
HCT VFR BLD AUTO: 36.9 % (ref 35–47)
HGB BLD-MCNC: 12.7 G/DL (ref 11.7–15.7)
HOLD SPECIMEN: NORMAL
HOLD SPECIMEN: NORMAL
IMM GRANULOCYTES # BLD: 0 10E3/UL
IMM GRANULOCYTES NFR BLD: 0 %
LYMPHOCYTES # BLD AUTO: 1.4 10E3/UL (ref 0.8–5.3)
LYMPHOCYTES NFR BLD AUTO: 40 %
MCH RBC QN AUTO: 33 PG (ref 26.5–33)
MCHC RBC AUTO-ENTMCNC: 34.4 G/DL (ref 31.5–36.5)
MCV RBC AUTO: 96 FL (ref 78–100)
MONOCYTES # BLD AUTO: 0.4 10E3/UL (ref 0–1.3)
MONOCYTES NFR BLD AUTO: 11 %
NEUTROPHILS # BLD AUTO: 1.6 10E3/UL (ref 1.6–8.3)
NEUTROPHILS NFR BLD AUTO: 46 %
NRBC # BLD AUTO: 0 10E3/UL
NRBC BLD AUTO-RTO: 0 /100
PLATELET # BLD AUTO: 253 10E3/UL (ref 150–450)
POTASSIUM SERPL-SCNC: 3.3 MMOL/L (ref 3.4–5.3)
RBC # BLD AUTO: 3.85 10E6/UL (ref 3.8–5.2)
SODIUM SERPL-SCNC: 138 MMOL/L (ref 135–145)
WBC # BLD AUTO: 3.4 10E3/UL (ref 4–11)

## 2024-12-26 PROCEDURE — 80048 BASIC METABOLIC PNL TOTAL CA: CPT | Performed by: EMERGENCY MEDICINE

## 2024-12-26 PROCEDURE — 82565 ASSAY OF CREATININE: CPT | Performed by: EMERGENCY MEDICINE

## 2024-12-26 PROCEDURE — 99283 EMERGENCY DEPT VISIT LOW MDM: CPT

## 2024-12-26 PROCEDURE — 85025 COMPLETE CBC W/AUTO DIFF WBC: CPT | Performed by: EMERGENCY MEDICINE

## 2024-12-26 PROCEDURE — 36415 COLL VENOUS BLD VENIPUNCTURE: CPT | Performed by: EMERGENCY MEDICINE

## 2024-12-26 PROCEDURE — 85004 AUTOMATED DIFF WBC COUNT: CPT | Performed by: EMERGENCY MEDICINE

## 2024-12-26 ASSESSMENT — COLUMBIA-SUICIDE SEVERITY RATING SCALE - C-SSRS
2. HAVE YOU ACTUALLY HAD ANY THOUGHTS OF KILLING YOURSELF IN THE PAST MONTH?: NO
6. HAVE YOU EVER DONE ANYTHING, STARTED TO DO ANYTHING, OR PREPARED TO DO ANYTHING TO END YOUR LIFE?: NO
1. IN THE PAST MONTH, HAVE YOU WISHED YOU WERE DEAD OR WISHED YOU COULD GO TO SLEEP AND NOT WAKE UP?: NO

## 2024-12-26 ASSESSMENT — ACTIVITIES OF DAILY LIVING (ADL): ADLS_ACUITY_SCORE: 41

## 2024-12-27 NOTE — ED PROVIDER NOTES
"  Emergency Department Note      History of Present Illness     Chief Complaint   Hand Pain      HPI   Susie Santana is a 54 year old female  who presents to the ED  for evaluation of hand pain. Patient reports that she developed painful sores on her hands, mouth, nose, and fever around Thanksgiving. The rash resolved and come back recently with new rash on her feet, legs, around her lips. The rash have gotten worse now with her fingers turning purple.  She reports feeling warm and chills yesterday but have not checked her temperature. She endorse sore throat from the sore in her mouths. She been to dermatologist last Thursday where she was told she might have hand, foot and mouth disease. She been prescribed Prednisone for 5 days and reports no improvement. She also been taking Ibuprofen. She reports taking Lexapro, hypertension medication, and Ibuprofen. Denies vomiting, diarrhea, chest pain, shortness of breath, headache, abdominal pain, rash on her core of her body, or itching.             Independent Historian   None    Review of External Notes   ***    Past Medical History     Medical History and Problem List   Past Medical History:   Diagnosis Date    Family history of malignant neoplasm of gastrointestinal tract        Medications   ALPRAZolam (XANAX) 0.25 MG tablet  escitalopram (LEXAPRO) 5 MG tablet  losartan (COZAAR) 25 MG tablet  mirtazapine (REMERON) 7.5 MG tablet        Surgical History   Past Surgical History:   Procedure Laterality Date    HC TOOTH EXTRACTION W/FORCEP      ZZC LIGATE FALLOPIAN TUBE,POSTPARTUM  2005    Tubal Ligation       Physical Exam     Patient Vitals for the past 24 hrs:   BP Temp Temp src Pulse Resp SpO2 Height Weight   12/26/24 1835 (!) 145/96 96.9  F (36.1  C) Temporal 91 16 100 % 1.651 m (5' 5\") 59.8 kg (131 lb 13.4 oz)     Physical Exam  ***    Diagnostics     Lab Results   Labs Ordered and Resulted from Time of ED Arrival to Time of ED Departure   BASIC METABOLIC PANEL - " Abnormal       Result Value    Sodium 138      Potassium 3.3 (*)     Chloride 97 (*)     Carbon Dioxide (CO2) 26      Anion Gap 15      Urea Nitrogen 4.3 (*)     Creatinine 0.58      GFR Estimate >90      Calcium 9.0      Glucose 96     CBC WITH PLATELETS AND DIFFERENTIAL - Abnormal    WBC Count 3.4 (*)     RBC Count 3.85      Hemoglobin 12.7      Hematocrit 36.9      MCV 96      MCH 33.0      MCHC 34.4      RDW 12.3      Platelet Count 253      % Neutrophils 46      % Lymphocytes 40      % Monocytes 11      % Eosinophils 1      % Basophils 1      % Immature Granulocytes 0      NRBCs per 100 WBC 0      Absolute Neutrophils 1.6      Absolute Lymphocytes 1.4      Absolute Monocytes 0.4      Absolute Eosinophils 0.0      Absolute Basophils 0.0      Absolute Immature Granulocytes 0.0      Absolute NRBCs 0.0         Imaging   No orders to display       EKG   None.   Independent Interpretation   None    ED Course      Medications Administered   Medications - No data to display    Procedures   Procedures     Discussion of Management   None    ED Course   ED Course as of 12/26/24 2209   Thu Dec 26, 2024   2200 I obtained history and examined the patient as noted above.        Additional Documentation  None    Medical Decision Making / Diagnosis     CMS Diagnoses: None    MIPS       None    [facility]   Susie Santana is a 54 year old female ***    Disposition   The patient was discharged.     Diagnosis   No diagnosis found.     Discharge Medications   New Prescriptions    No medications on file         Scribe Disclosure:  I, Billie Dykes, am serving as a scribe at 10:01 PM on 12/26/2024 to document services personally performed by Haim Persaud MD based on my observations and the provider's statements to me.         GFR Estimate >90      Calcium 9.0      Glucose 96     CBC WITH PLATELETS AND DIFFERENTIAL - Abnormal    WBC Count 3.4 (*)     RBC Count 3.85      Hemoglobin 12.7      Hematocrit 36.9      MCV 96      MCH 33.0      MCHC 34.4      RDW 12.3      Platelet Count 253      % Neutrophils 46      % Lymphocytes 40      % Monocytes 11      % Eosinophils 1      % Basophils 1      % Immature Granulocytes 0      NRBCs per 100 WBC 0      Absolute Neutrophils 1.6      Absolute Lymphocytes 1.4      Absolute Monocytes 0.4      Absolute Eosinophils 0.0      Absolute Basophils 0.0      Absolute Immature Granulocytes 0.0      Absolute NRBCs 0.0         Imaging   No orders to display       EKG   None.   Independent Interpretation   None    ED Course      Medications Administered   Medications - No data to display    Procedures   Procedures     Discussion of Management   None    ED Course   ED Course as of 12/27/24 0332   Thu Dec 26, 2024   2200 I obtained history and examined the patient as noted above.        Additional Documentation  None    Medical Decision Making / Diagnosis     CMS Diagnoses: None    MIPS       None    MDM   Susie Santana is a 54 year old female who presents with ongoing rash to her bilateral hands and legs/feet.  She also has a single aphthous ulcer in her throat.  She is overall quite well-appearing and nontoxic.  She is not febrile.  This has been going on for the past month.  She is already seen primary care and dermatology for this and they suspected that this was viral, likely hand-foot-and-mouth disease.  I have no reason to suspect otherwise.  I do not think that she has Sanchez-Tushar syndrome or TEN based on her well appearance and chronicity of her symptoms.  There are no new medications that would be culprit for causing a drug rash.  We did check labs and she has slight hypokalemia and slight leukopenia which are nonspecific.  I offered the patient to try a longer course of steroids or to refer her  to another dermatologist for second opinion but the patient declined these.  I encouraged her to follow-up closely with her primary care provider and we discussed return precautions.    Disposition   The patient was discharged.     Diagnosis     ICD-10-CM    1. Rash and nonspecific skin eruption  R21       2. Hypokalemia  E87.6            Discharge Medications   Discharge Medication List as of 12/26/2024 10:17 PM            Scribe Disclosure:  I, Billie Dykes, am serving as a scribe at 10:01 PM on 12/26/2024 to document services personally performed by Haim Persaud MD based on my observations and the provider's statements to me.        Haim Persaud MD  12/27/24 033

## 2024-12-27 NOTE — ED TRIAGE NOTES
"Here for fever, fina hand pain and peeling x1 month. Was told it's just a virus 3 other times. Pt states \"my left index finger was turning blue\".         "

## 2024-12-30 ENCOUNTER — OFFICE VISIT (OUTPATIENT)
Dept: FAMILY MEDICINE | Facility: CLINIC | Age: 54
End: 2024-12-30

## 2024-12-30 VITALS
DIASTOLIC BLOOD PRESSURE: 81 MMHG | HEART RATE: 105 BPM | OXYGEN SATURATION: 97 % | SYSTOLIC BLOOD PRESSURE: 122 MMHG | WEIGHT: 130 LBS | TEMPERATURE: 97.4 F | BODY MASS INDEX: 21.66 KG/M2 | HEIGHT: 65 IN

## 2024-12-30 DIAGNOSIS — I10 ESSENTIAL HYPERTENSION: ICD-10-CM

## 2024-12-30 DIAGNOSIS — G89.29 CHRONIC PAIN OF RIGHT THUMB: ICD-10-CM

## 2024-12-30 DIAGNOSIS — Z01.818 PREOPERATIVE EXAMINATION: Primary | ICD-10-CM

## 2024-12-30 DIAGNOSIS — R21 RASH AND NONSPECIFIC SKIN ERUPTION: ICD-10-CM

## 2024-12-30 DIAGNOSIS — F41.9 ANXIETY: ICD-10-CM

## 2024-12-30 DIAGNOSIS — M79.644 CHRONIC PAIN OF RIGHT THUMB: ICD-10-CM

## 2024-12-30 PROCEDURE — 99213 OFFICE O/P EST LOW 20 MIN: CPT

## 2024-12-30 RX ORDER — DESOXIMETASONE 2.5 MG/G
CREAM TOPICAL
COMMUNITY
Start: 2024-12-19

## 2024-12-30 RX ORDER — MOMETASONE FUROATE 1 MG/G
CREAM TOPICAL
COMMUNITY
Start: 2024-12-19

## 2024-12-30 NOTE — NURSING NOTE
Chief Complaint   Patient presents with    Pre-Op Exam     DOS 1/9/25 R Thumb Surgery at [facility] done by Dr. Black

## 2024-12-30 NOTE — PROGRESS NOTES
Preoperative Evaluation  [facility]  [address] [place] [address]  [address]  [facility] [address]  Phone: [phone]  Fax: [phone]  Primary Provider: Marylu Garcia MD  Pre-op Performing Provider: Nadeen Evangelista PA-C  Dec 30, 2024         12/30/2024   Surgical Information   What procedure is being done? Right Thumb Surgery   Facility or Hospital where procedure/surgery will be performed: [facility]   Who is doing the procedure / surgery? Dr. Black   Date of surgery / procedure: 1/9/25   Time of surgery / procedure: TBD   Where do you plan to recover after surgery? at home with family     Fax number for surgical facility: [phone]    Assessment & Plan     The proposed surgical procedure is considered INTERMEDIATE risk.    Preoperative examination  - Susie currently has likely hand, foot, and mouth disease. Discussed I do not recommend surgery until rash completely resolves. I did call Dr. Black's office and notify them of this, will contact patient once I hear back from their office.     Chronic pain of right thumb    Essential hypertension  - Well controlled.     Anxiety--followed by psychiatry  - Well controlled.     Rash and nonspecific skin eruption  - Likely hand, foot, and mouth disease. Continue to treat symptoms conservatively with daily Aquaphor and steroid ointments from dermatology.     Antiplatelet or Anticoagulation Medication Instructions   - Patient is on no antiplatelet or anticoagulation medications.    Additional Medication Instructions  - Take all scheduled medications on the day of surgery.     Recommendation  - Surgery is not recommended due to current hand, foot, and mouth disease. I have notified the surgeon's office of this issue.     Subjective   Susie is a 54 year old, presenting for the following:  Pre-Op Exam (DOS 1/9/25 [facility] by Dr. Black )    Susie presents for a preoperative examination for upcoming right thumb surgery with   [facility] at [facility] in [place] on 01/09/25. Notes she has had right thumb pain for years, was told she has arthritis, was getting steroid injections but now these aren't helping so is scheduled for surgery.     Medical history and daily medications reviewed. Of note, Susie currently has suspected hand, foot, and mouth disease which she has been battling since Thanksgiving. Initially during that time she had fevers and mouth sores and then for the last 3 weeks has been having lesions on her hands and legs. Notes things seem to be healing but she has pain in both of her hands. She saw dermatology recently who gave her Desoximetasone and Mometasone steroid creams. Denies any fevers for the last week. Feels energy and appetite are both improving.     HPI related to upcoming procedure:        12/30/2024   Pre-Op Questionnaire   Have you ever had a heart attack or stroke? No   Have you ever had surgery on your heart or blood vessels, such as a stent placement, a coronary artery bypass, or surgery on an artery in your head, neck, heart, or legs? No   Do you have chest pain with activity? No   Do you have a history of heart failure? No   Do you currently have a cold, bronchitis or symptoms of other infection? (!) YES, currently has hand, foot, and mouth disease.    Do you have a cough, shortness of breath, or wheezing? No   Do you or anyone in your family have previous history of blood clots? No   Do you or does anyone in your family have a serious bleeding problem such as prolonged bleeding following surgeries or cuts? No   Have you ever had problems with anemia or been told to take iron pills? (!) YES, during early 20's, no concerns since.    Have you had any abnormal blood loss such as black, tarry or bloody stools, or abnormal vaginal bleeding? No   Have you ever had a blood transfusion? No   Are you willing to have a blood transfusion if it is medically needed before, during, or after your surgery? Yes   Have you or any of your  relatives ever had problems with anesthesia? (!) YES, patient notes she and her mom typically don't need as much anesthesia as most people.    Do you have sleep apnea, excessive snoring or daytime drowsiness? No   Do you have any artifical heart valves or other implanted medical devices like a pacemaker, defibrillator, or continuous glucose monitor? No   Do you have artificial joints? No   Are you allergic to latex? (!) YES, gets a rash and heart palpitations from latex.      Health Care Directive  Patient does not have a Health Care Directive: Patient states has Advance Directive and will bring in a copy to clinic.    Preoperative Review of    reviewed - controlled substances reflected in medication list.    Status of Chronic Conditions:  See problem list for active medical problems.  Problems all longstanding and stable, except as noted/documented.  See ROS for pertinent symptoms related to these conditions.    Patient Active Problem List    Diagnosis Date Noted    Alcohol dependence, daily use (H) 12/03/2024     Priority: Medium    Elevated liver function tests 12/03/2024     Priority: Medium    Hypokalemia 12/03/2024     Priority: Medium    Elevated blood pressure reading without diagnosis of hypertension 07/08/2024     Priority: Medium    Anxiety--followed by psychiatry 08/13/2020     Priority: Medium    Chondromalacia of patella 10/07/2004     Priority: Medium    Polyp of nasal cavity 09/27/2001     Priority: Medium    Personal history of tobacco use, presenting hazards to health 04/20/2000     Priority: Medium    Ganglion 04/20/2000     Priority: Medium     Problem list name updated by automated process. Provider to review      Family history of malignant neoplasm of gastrointestinal tract      Priority: Medium     Father, PGM      Allergic rhinitis      Priority: Medium     Problem list name updated by automated process. Provider to review      ACP (advance care planning) 12/21/2017     Priority: Low      Advance Care Planning 12/21/2017: ACP Review of Chart / Resources Provided:  Reviewed chart for advance care plan.  Susie Santana has no plan or code status on file. Discussed available resources and patient declined information today.   Added by Monet Sanchez              Past Medical History:   Diagnosis Date    Family history of malignant neoplasm of gastrointestinal tract      Past Surgical History:   Procedure Laterality Date    FINGER SURGERY Right     right index finger    FOOT SURGERY Right     benign rheumatoid nodules removed    HC TOOTH EXTRACTION W/FORCEP      wisdom teeth    SKIN CANCER EXCISION      multiple basal cells removed    ZZC LIGATE FALLOPIAN TUBE,POSTPARTUM  01/01/2005    Tubal Ligation     Current Outpatient Medications   Medication Sig Dispense Refill    ALPRAZolam (XANAX) 0.25 MG tablet Take 0.25 mg by mouth as needed      desoximetasone (TOPICORT) 0.25 % external cream APPLY TOPICALLY TO RASH TWICE DAILY EVERY DAY AS NEEDED      escitalopram (LEXAPRO) 5 MG tablet Take 5 mg by mouth daily      losartan (COZAAR) 25 MG tablet Take 1 tablet (25 mg) by mouth daily. 90 tablet 1    mirtazapine (REMERON) 7.5 MG tablet Take 7.5 mg by mouth at bedtime      mometasone (ELOCON) 0.1 % external cream        Allergies   Allergen Reactions    Cephalosporins Hives    Dogs Itching    Latex      palpitations;    Sulfa Antibiotics Hives      Social History     Tobacco Use    Smoking status: Former     Current packs/day: 0.50     Average packs/day: 0.5 packs/day for 30.0 years (15.0 ttl pk-yrs)     Types: Cigarettes     Passive exposure: Current    Smokeless tobacco: Never    Tobacco comments:     Quit cigarettes 05/27/24, vapes now   Substance Use Topics    Alcohol use: Yes     Alcohol/week: 14.0 standard drinks of alcohol     Types: 14 Standard drinks or equivalent per week     Family History   Problem Relation Age of Onset    No Known Problems Mother     Cancer Father 60        colon    Anxiety  "Disorder Sister     Alzheimer Disease Maternal Grandmother     Diabetes Maternal Grandfather     Alzheimer Disease Paternal Grandmother     Cancer Paternal Grandmother         colon    Alzheimer Disease Paternal Grandfather     Heart Disease No family hx of     Breast Cancer No family hx of      History   Drug Use No     Review of Systems  CONSTITUTIONAL: POSITIVE for low grade fevers (nothing since 12/25/24). NEGATIVE for chills or change in weight.  INTEGUMENTARY/SKIN: POSITIVE for rash on hands and legs.   EYES: NEGATIVE for vision changes or irritation  ENT/MOUTH: POSITIVE for mouth sores. NEGATIVE for ear problems.   RESP: NEGATIVE for significant cough or SOB  CV: NEGATIVE for chest pain, palpitations or peripheral edema  GI: NEGATIVE for nausea, abdominal pain, heartburn, or change in bowel habits  : NEGATIVE for frequency, dysuria, or hematuria  MUSCULOSKELETAL: NEGATIVE for significant arthralgias or myalgia  NEURO: NEGATIVE for weakness, dizziness or paresthesias  ENDOCRINE: NEGATIVE for temperature intolerance, skin/hair changes  HEME: NEGATIVE for bleeding problems  PSYCHIATRIC: NEGATIVE for changes in mood or affect    Objective    /81 (BP Location: Left arm, Patient Position: Chair, Cuff Size: Adult Large)   Pulse 105   Temp 97.4  F (36.3  C) (Temporal)   Ht 1.651 m (5' 5\")   Wt 59 kg (130 lb)   SpO2 97%   BMI 21.63 kg/m     Estimated body mass index is 21.63 kg/m  as calculated from the following:    Height as of this encounter: 1.651 m (5' 5\").    Weight as of this encounter: 59 kg (130 lb).    Physical Exam  GENERAL: alert and no distress  EYES: Eyes grossly normal to inspection, PERRL and conjunctivae and sclerae normal  HENT: ear canals and TM's normal, no congestion, small sore present on right soft palate otherwise no other ulcers or lesions  NECK: no adenopathy, no asymmetry, masses, or scars  RESP: lungs clear to auscultation - no rales, rhonchi or wheezes  CV: regular rate and " rhythm, normal S1 S2, no S3 or S4, no murmur, click or rub, no peripheral edema  ABDOMEN: soft, nontender, no hepatosplenomegaly, no masses and bowel sounds normal  MS: no gross musculoskeletal defects noted, no edema  SKIN: superficial skin peeling and erythema noted on bilateral palms along with scattered maculopapular rash on hands extending to arms and legs bilaterally  NEURO: Normal strength and tone, mentation intact and speech normal  PSYCH: mentation appears normal, affect normal/bright    Recent Labs   Lab Test 12/26/24 2000 12/02/24 0000 07/29/24  1514   HGB 12.7  --  12.1     --  398*   INR  --  0.9  --     129.0*  --    POTASSIUM 3.3* 4.14  --    CR 0.58 0.63  --       Diagnostics  Recent Results (from the past 240 hours)   Basic metabolic panel (BMP)    Collection Time: 12/26/24  8:00 PM   Result Value Ref Range    Sodium 138 135 - 145 mmol/L    Potassium 3.3 (L) 3.4 - 5.3 mmol/L    Chloride 97 (L) 98 - 107 mmol/L    Carbon Dioxide (CO2) 26 22 - 29 mmol/L    Anion Gap 15 7 - 15 mmol/L    Urea Nitrogen 4.3 (L) 6.0 - 20.0 mg/dL    Creatinine 0.58 0.51 - 0.95 mg/dL    GFR Estimate >90 >60 mL/min/1.73m2    Calcium 9.0 8.8 - 10.4 mg/dL    Glucose 96 70 - 99 mg/dL   CBC with platelets and differential    Collection Time: 12/26/24  8:00 PM   Result Value Ref Range    WBC Count 3.4 (L) 4.0 - 11.0 10e3/uL    RBC Count 3.85 3.80 - 5.20 10e6/uL    Hemoglobin 12.7 11.7 - 15.7 g/dL    Hematocrit 36.9 35.0 - 47.0 %    MCV 96 78 - 100 fL    MCH 33.0 26.5 - 33.0 pg    MCHC 34.4 31.5 - 36.5 g/dL    RDW 12.3 10.0 - 15.0 %    Platelet Count 253 150 - 450 10e3/uL    % Neutrophils 46 %    % Lymphocytes 40 %    % Monocytes 11 %    % Eosinophils 1 %    % Basophils 1 %    % Immature Granulocytes 0 %    NRBCs per 100 WBC 0 <1 /100    Absolute Neutrophils 1.6 1.6 - 8.3 10e3/uL    Absolute Lymphocytes 1.4 0.8 - 5.3 10e3/uL    Absolute Monocytes 0.4 0.0 - 1.3 10e3/uL    Absolute Eosinophils 0.0 0.0 - 0.7 10e3/uL     Absolute Basophils 0.0 0.0 - 0.2 10e3/uL    Absolute Immature Granulocytes 0.0 <=0.4 10e3/uL    Absolute NRBCs 0.0 10e3/uL   Extra Blue Top Tube    Collection Time: 12/26/24  8:00 PM   Result Value Ref Range    Hold Specimen JIC    Extra Red Top Tube    Collection Time: 12/26/24  8:00 PM   Result Value Ref Range    Hold Specimen JIC       No EKG required, no history of coronary heart disease, significant arrhythmia, peripheral arterial disease or other structural heart disease.    Revised Cardiac Risk Index (RCRI)  The patient has the following serious cardiovascular risks for perioperative complications:   - No serious cardiac risks = 0 points     RCRI Interpretation: 0 points: Class I (very low risk - 0.4% complication rate)    Signed Electronically by: Nadeen Evangelista PA-C  A copy of this evaluation report is provided to the requesting physician.

## 2025-02-03 NOTE — PROGRESS NOTES
Preoperative Evaluation  [facility]YSICIANS  [address] [place] [address]  [address]  [facility] [address]  Phone: [phone]  Fax: [phone]  Primary Provider: Marylu Garcia MD  Pre-op Performing Provider: Nadeen Evangelista PA-C  Feb 13, 2025 2/13/2025   Surgical Information   What procedure is being done? Right hand repair   Facility or Hospital where procedure/surgery will be performed: [facility]   Who is doing the procedure / surgery? Dr. Black   Date of surgery / procedure: 02/27/25   Time of surgery / procedure: TBD   Where do you plan to recover after surgery? at home with family     Fax number for surgical facility: [phone]    Assessment & Plan     The proposed surgical procedure is considered INTERMEDIATE risk.    Preoperative examination  - Patient is cleared for surgery. CBC and BMP are within normal limits.   - VENOUS COLLECTION  - Hemogram Platelet (BFP)  - Basic Metabolic Panel (BFP)    Chronic pain of right thumb    Essential hypertension  - Stable, Susie does not currently regularly check her BP at home, will start to do this as discussed in clinic BP readings have not been at goal of being <130/80, she will follow up if her at home readings (will plan to check 2-3 times weekly) are not <130/80 and would plan to increase dose of Losartan.   - VENOUS COLLECTION  - Basic Metabolic Panel (BFP)    Anxiety--followed by psychiatry  - Well controlled.     Atrophic vaginitis  - Well controlled.     Rash and nonspecific skin eruption  - Resolved hand, foot, and mouth disease.     Antiplatelet or Anticoagulation Medication Instructions   - We reviewed the medication list and the patient is not on an antiplatelet or anticoagulation medications.    Additional Medication Instructions  - We reviewed the medication list and there are no chronic medications that need to be adjusted for this procedure.    Recommendation  - Approval given to proceed with proposed procedure, without  further diagnostic evaluation.    Subjective   Susie is a 54 year old, presenting for the following:  Pre-Op Exam    Susie presents for a preoperative examination for upcoming right thumb surgery with Dr. Black at [facility] in [place] on 02/27/25. Notes she has had right thumb pain for years, was told she has arthritis, was getting steroid injections but now these aren't helping so is scheduled for surgery.     Medical history and daily medications reviewed. Of note, patient was seen for this pre-op on 12/30/24 however was still recovering from hand, foot, and mouth disease and was not cleared for surgery at that time. States now all of her ulcers in her mouth and lesions on her hands and feet have resolved.     HPI related to upcoming procedure:        2/13/2025   Pre-Op Questionnaire   Have you ever had a heart attack or stroke? No   Have you ever had surgery on your heart or blood vessels, such as a stent placement, a coronary artery bypass, or surgery on an artery in your head, neck, heart, or legs? No   Do you have chest pain with activity? No   Do you have a history of heart failure? No   Do you currently have a cold, bronchitis or symptoms of other infection? No   Do you have a cough, shortness of breath, or wheezing? No   Do you or anyone in your family have previous history of blood clots? No   Do you or does anyone in your family have a serious bleeding problem such as prolonged bleeding following surgeries or cuts? No   Have you ever had problems with anemia or been told to take iron pills? (!) YES during early 20's, no concerns since.    Have you had any abnormal blood loss such as black, tarry or bloody stools, or abnormal vaginal bleeding? No   Have you ever had a blood transfusion? No   Are you willing to have a blood transfusion if it is medically needed before, during, or after your surgery? Yes   Have you or any of your relatives ever had problems with anesthesia? (!) YES patient notes she and her mom  typically don't need as much anesthesia as most people.    Do you have sleep apnea, excessive snoring or daytime drowsiness? No   Do you have any artifical heart valves or other implanted medical devices like a pacemaker, defibrillator, or continuous glucose monitor? No   Do you have artificial joints? No   Are you allergic to latex? (!) YES, gets a rash and heart palpitations from latex.      Health Care Directive  Patient does not have a Health Care Directive: Discussed advance care planning with patient; information given to patient to review.    Preoperative Review of    reviewed - controlled substances reflected in medication list.    Status of Chronic Conditions:  See problem list for active medical problems.  Problems all longstanding and stable, except as noted/documented.  See ROS for pertinent symptoms related to these conditions.    Patient Active Problem List    Diagnosis Date Noted    Alcohol dependence, daily use (H) 12/03/2024     Priority: Medium    Elevated liver function tests 12/03/2024     Priority: Medium    Hypokalemia 12/03/2024     Priority: Medium    Elevated blood pressure reading without diagnosis of hypertension 07/08/2024     Priority: Medium    Anxiety--followed by psychiatry 08/13/2020     Priority: Medium    Chondromalacia of patella 10/07/2004     Priority: Medium    Polyp of nasal cavity 09/27/2001     Priority: Medium    Personal history of tobacco use, presenting hazards to health 04/20/2000     Priority: Medium    Ganglion 04/20/2000     Priority: Medium     Problem list name updated by automated process. Provider to review      Family history of malignant neoplasm of gastrointestinal tract      Priority: Medium     Father, PGM      Allergic rhinitis      Priority: Medium     Problem list name updated by automated process. Provider to review      ACP (advance care planning) 12/21/2017     Priority: Low     Advance Care Planning 12/21/2017: ACP Review of Chart / Resources  Provided:  Reviewed chart for advance care plan.  Susie Santana has no plan or code status on file. Discussed available resources and patient declined information today.   Added by Monet Sanchez              Past Medical History:   Diagnosis Date    Family history of malignant neoplasm of gastrointestinal tract      Past Surgical History:   Procedure Laterality Date    ENDOMETRIAL ABLATION      FINGER SURGERY Right     right index finger    FOOT SURGERY Right     benign rheumatoid nodules removed    HC TOOTH EXTRACTION W/FORCEP      wisdom teeth    SKIN CANCER EXCISION      multiple basal cells removed    ZZC LIGATE FALLOPIAN TUBE,POSTPARTUM  01/01/2005    Tubal Ligation     Current Outpatient Medications   Medication Sig Dispense Refill    ALPRAZolam (XANAX) 0.25 MG tablet Take 0.25 mg by mouth as needed      desoximetasone (TOPICORT) 0.25 % external cream APPLY TOPICALLY TO RASH TWICE DAILY EVERY DAY AS NEEDED      escitalopram (LEXAPRO) 5 MG tablet Take 5 mg by mouth daily      estradiol (ESTRACE) 0.1 MG/GM vaginal cream Place 1 g vaginally twice a week. Initially apply 1 g/day intravaginally for 2 weeks, then 1 g one to three times per week. 42.5 g 0    losartan (COZAAR) 25 MG tablet Take 1 tablet (25 mg) by mouth daily. 90 tablet 1    mirtazapine (REMERON) 7.5 MG tablet Take 7.5 mg by mouth at bedtime      mometasone (ELOCON) 0.1 % external cream        Allergies   Allergen Reactions    Cephalosporins Hives    Dogs Itching    Latex      palpitations;    Sulfa Antibiotics Hives      Social History     Tobacco Use    Smoking status: Former     Current packs/day: 0.50     Average packs/day: 0.5 packs/day for 30.0 years (15.0 ttl pk-yrs)     Types: Cigarettes     Passive exposure: Current    Smokeless tobacco: Never    Tobacco comments:     Quit cigarettes 05/27/24, vapes now   Substance Use Topics    Alcohol use: Yes     Alcohol/week: 14.0 standard drinks of alcohol     Types: 14 Standard drinks or equivalent  "per week     Family History   Problem Relation Age of Onset    No Known Problems Mother     Cancer Father 60        colon    Anxiety Disorder Sister     Alzheimer Disease Maternal Grandmother     Diabetes Maternal Grandfather     Alzheimer Disease Paternal Grandmother     Cancer Paternal Grandmother         colon    Alzheimer Disease Paternal Grandfather     Heart Disease No family hx of     Breast Cancer No family hx of      History   Drug Use No     Review of Systems  CONSTITUTIONAL: NEGATIVE for fever, chills, change in weight  INTEGUMENTARY/SKIN: NEGATIVE for worrisome rashes, moles or lesions  EYES: NEGATIVE for vision changes or irritation  ENT/MOUTH: NEGATIVE for ear, mouth and throat problems  RESP: NEGATIVE for significant cough or SOB  CV: NEGATIVE for chest pain, palpitations or peripheral edema  GI: NEGATIVE for nausea, abdominal pain, heartburn, or change in bowel habits  : NEGATIVE for frequency, dysuria, or hematuria  MUSCULOSKELETAL: NEGATIVE for significant arthralgias or myalgia  NEURO: NEGATIVE for weakness, dizziness or paresthesias  ENDOCRINE: NEGATIVE for temperature intolerance, skin/hair changes  HEME: NEGATIVE for bleeding problems  PSYCHIATRIC: NEGATIVE for changes in mood or affect    Objective    /84 (BP Location: Right arm, Patient Position: Sitting, Cuff Size: Adult Large)   Pulse 87   Temp 98.3  F (36.8  C) (Temporal)   Ht 1.651 m (5' 5\")   Wt 59.9 kg (132 lb)   SpO2 97%   BMI 21.97 kg/m     Estimated body mass index is 21.97 kg/m  as calculated from the following:    Height as of this encounter: 1.651 m (5' 5\").    Weight as of this encounter: 59.9 kg (132 lb).    Physical Exam  GENERAL: alert and no distress  EYES: Eyes grossly normal to inspection, PERRL and conjunctivae and sclerae normal  HENT: ear canals and TM's normal, nose and mouth without ulcers or lesions  NECK: no adenopathy, no asymmetry, masses, or scars  RESP: lungs clear to auscultation - no rales, " rhonchi or wheezes  CV: regular rate and rhythm, normal S1 S2, no S3 or S4, no murmur, click or rub, no peripheral edema  ABDOMEN: soft, nontender, no hepatosplenomegaly, no masses and bowel sounds normal  MS: no gross musculoskeletal defects noted, no edema  SKIN: no suspicious lesions or rashes  NEURO: Normal strength and tone, mentation intact and speech normal  PSYCH: mentation appears normal, affect normal/bright    Recent Labs   Lab Test 12/26/24 2000 12/02/24 0000 07/29/24  1514   HGB 12.7  --  12.1     --  398*   INR  --  0.9  --     129.0*  --    POTASSIUM 3.3* 4.14  --    CR 0.58 0.63  --       Diagnostics  Recent Results (from the past 24 hours)   Basic Metabolic Panel (BFP)    Collection Time: 02/13/25 12:00 AM   Result Value Ref Range    Carbon Dioxide 27.9 20 - 32 mmol/L    Creatinine 0.70 0.60 - 1.30 mg/dL    Glucose 105 (A) 60 - 99 mg/dL    Sodium 135.2 135 - 146 mmol/L    Potassium 4.46 3.5 - 5.3 mmol/L    Chloride 97.3 (A) 98 - 110 mmol/L    Urea Nitrogen 8 7 - 25 mg/dL    Calcium 9.8 8.6 - 10.3 mg/dL    BUN/Creatinine Ratio 11 6 - 32   Hemogram Platelet (BFP)    Collection Time: 02/13/25  1:47 PM   Result Value Ref Range    WBC 6.6 4.0 - 11 10*9/L    RBC Count 3.55 (A) 3.8 - 5.2 10*12/L    Hemoglobin 12.4 11.7 - 15.7 g/dL    Hematocrit 38.8 35.0 - 47.0 %    .2 (A) 78 - 100 fL    MCH 34.9 (A) 26 - 33 pg    MCHC 32.0 31 - 36 g/dL    RDW 11.8 %    Platelet Count 240 150 - 375 10^9/L      No EKG required, no history of coronary heart disease, significant arrhythmia, peripheral arterial disease or other structural heart disease.    Revised Cardiac Risk Index (RCRI)  The patient has the following serious cardiovascular risks for perioperative complications:   - No serious cardiac risks = 0 points     RCRI Interpretation: 0 points: Class I (very low risk - 0.4% complication rate)    Signed Electronically by: Nadeen Evangelista PA-C  A copy of this evaluation report is provided to the  requesting physician.

## 2025-02-13 ENCOUNTER — OFFICE VISIT (OUTPATIENT)
Dept: FAMILY MEDICINE | Facility: CLINIC | Age: 55
End: 2025-02-13

## 2025-02-13 VITALS
WEIGHT: 132 LBS | HEART RATE: 87 BPM | DIASTOLIC BLOOD PRESSURE: 84 MMHG | HEIGHT: 65 IN | BODY MASS INDEX: 21.99 KG/M2 | OXYGEN SATURATION: 97 % | SYSTOLIC BLOOD PRESSURE: 136 MMHG | TEMPERATURE: 98.3 F

## 2025-02-13 DIAGNOSIS — I10 ESSENTIAL HYPERTENSION: ICD-10-CM

## 2025-02-13 DIAGNOSIS — G89.29 CHRONIC PAIN OF RIGHT THUMB: ICD-10-CM

## 2025-02-13 DIAGNOSIS — N95.2 ATROPHIC VAGINITIS: ICD-10-CM

## 2025-02-13 DIAGNOSIS — M79.644 CHRONIC PAIN OF RIGHT THUMB: ICD-10-CM

## 2025-02-13 DIAGNOSIS — R21 RASH AND NONSPECIFIC SKIN ERUPTION: ICD-10-CM

## 2025-02-13 DIAGNOSIS — Z01.818 PREOPERATIVE EXAMINATION: Primary | ICD-10-CM

## 2025-02-13 DIAGNOSIS — F41.9 ANXIETY: ICD-10-CM

## 2025-02-13 LAB
BUN SERPL-MCNC: 8 MG/DL (ref 7–25)
BUN/CREATININE RATIO: 11 (ref 6–32)
CALCIUM SERPL-MCNC: 9.8 MG/DL (ref 8.6–10.3)
CHLORIDE SERPLBLD-SCNC: 97.3 MMOL/L (ref 98–110)
CO2 SERPL-SCNC: 27.9 MMOL/L (ref 20–32)
CREAT SERPL-MCNC: 0.7 MG/DL (ref 0.6–1.3)
ERYTHROCYTE [DISTWIDTH] IN BLOOD BY AUTOMATED COUNT: 11.8 %
GLUCOSE SERPL-MCNC: 105 MG/DL (ref 60–99)
HCT VFR BLD AUTO: 38.8 % (ref 35–47)
HEMOGLOBIN: 12.4 G/DL (ref 11.7–15.7)
MCH RBC QN AUTO: 34.9 PG (ref 26–33)
MCHC RBC AUTO-ENTMCNC: 32 G/DL (ref 31–36)
MCV RBC AUTO: 109.2 FL (ref 78–100)
PLATELET COUNT - QUEST: 240 10^9/L (ref 150–375)
POTASSIUM SERPL-SCNC: 4.46 MMOL/L (ref 3.5–5.3)
RBC # BLD AUTO: 3.55 10*12/L (ref 3.8–5.2)
SODIUM SERPL-SCNC: 135.2 MMOL/L (ref 135–146)
WBC # BLD AUTO: 6.6 10*9/L (ref 4–11)

## 2025-05-22 ENCOUNTER — OFFICE VISIT (OUTPATIENT)
Dept: FAMILY MEDICINE | Facility: CLINIC | Age: 55
End: 2025-05-22

## 2025-05-22 ENCOUNTER — HOSPITAL ENCOUNTER (EMERGENCY)
Facility: CLINIC | Age: 55
Discharge: HOME | End: 2025-05-22
Attending: PHYSICIAN ASSISTANT
Payer: COMMERCIAL

## 2025-05-22 ENCOUNTER — APPOINTMENT (OUTPATIENT)
Dept: CT IMAGING | Facility: CLINIC | Age: 55
End: 2025-05-22
Attending: PHYSICIAN ASSISTANT
Payer: COMMERCIAL

## 2025-05-22 VITALS
WEIGHT: 131.2 LBS | BODY MASS INDEX: 21.83 KG/M2 | HEART RATE: 93 BPM | SYSTOLIC BLOOD PRESSURE: 136 MMHG | OXYGEN SATURATION: 98 % | TEMPERATURE: 97.3 F | DIASTOLIC BLOOD PRESSURE: 82 MMHG

## 2025-05-22 VITALS
TEMPERATURE: 98.3 F | RESPIRATION RATE: 18 BRPM | SYSTOLIC BLOOD PRESSURE: 154 MMHG | HEART RATE: 93 BPM | DIASTOLIC BLOOD PRESSURE: 104 MMHG | BODY MASS INDEX: 22.15 KG/M2 | WEIGHT: 132.94 LBS | HEIGHT: 65 IN | OXYGEN SATURATION: 100 %

## 2025-05-22 DIAGNOSIS — R10.9 LEFT FLANK PAIN: ICD-10-CM

## 2025-05-22 DIAGNOSIS — R74.8 ELEVATED LIVER ENZYMES: ICD-10-CM

## 2025-05-22 DIAGNOSIS — R10.9 FLANK PAIN: Primary | ICD-10-CM

## 2025-05-22 DIAGNOSIS — E87.1 HYPONATREMIA: ICD-10-CM

## 2025-05-22 DIAGNOSIS — R33.9 URINARY RETENTION: ICD-10-CM

## 2025-05-22 DIAGNOSIS — F10.920 ALCOHOLIC INTOXICATION WITHOUT COMPLICATION: ICD-10-CM

## 2025-05-22 LAB
ALBUMIN SERPL BCG-MCNC: 4.6 G/DL (ref 3.5–5.2)
ALP SERPL-CCNC: 127 U/L (ref 40–150)
ALT SERPL W P-5'-P-CCNC: 123 U/L (ref 0–50)
ANION GAP SERPL CALCULATED.3IONS-SCNC: 15 MMOL/L (ref 7–15)
APPEARANCE UR: ABNORMAL
AST SERPL W P-5'-P-CCNC: 186 U/L (ref 0–45)
BACTERIA, UR: ABNORMAL
BASOPHILS # BLD AUTO: 0 10E3/UL (ref 0–0.2)
BASOPHILS NFR BLD AUTO: 1 %
BILIRUB DIRECT SERPL-MCNC: 0.17 MG/DL (ref 0–0.3)
BILIRUB SERPL-MCNC: 0.3 MG/DL
BILIRUB UR QL: ABNORMAL
BUN SERPL-MCNC: 3 MG/DL (ref 6–20)
CALCIUM SERPL-MCNC: 9 MG/DL (ref 8.8–10.4)
CASTS/LPF: ABNORMAL
CHLORIDE SERPL-SCNC: 82 MMOL/L (ref 98–107)
COLOR UR: YELLOW
CREAT SERPL-MCNC: 0.47 MG/DL (ref 0.51–0.95)
EGFRCR SERPLBLD CKD-EPI 2021: >90 ML/MIN/1.73M2
EOSINOPHIL # BLD AUTO: 0.1 10E3/UL (ref 0–0.7)
EOSINOPHIL NFR BLD AUTO: 1 %
EP/HPF: ABNORMAL
ERYTHROCYTE [DISTWIDTH] IN BLOOD BY AUTOMATED COUNT: 12.2 % (ref 10–15)
ETHANOL SERPL-MCNC: 0.32 G/DL
GLUCOSE SERPL-MCNC: 93 MG/DL (ref 70–99)
GLUCOSE URINE: ABNORMAL MG/DL
HCO3 SERPL-SCNC: 26 MMOL/L (ref 22–29)
HCT VFR BLD AUTO: 32.6 % (ref 35–47)
HGB BLD-MCNC: 12 G/DL (ref 11.7–15.7)
HGB UR QL: ABNORMAL
HOLD SPECIMEN: NORMAL
HOLD SPECIMEN: NORMAL
IMM GRANULOCYTES # BLD: 0 10E3/UL
IMM GRANULOCYTES NFR BLD: 1 %
KETONES UR QL: ABNORMAL MG/DL
LYMPHOCYTES # BLD AUTO: 1.3 10E3/UL (ref 0.8–5.3)
LYMPHOCYTES NFR BLD AUTO: 30 %
MCH RBC QN AUTO: 34 PG (ref 26.5–33)
MCHC RBC AUTO-ENTMCNC: 36.8 G/DL (ref 31.5–36.5)
MCV RBC AUTO: 92 FL (ref 78–100)
MISC.: ABNORMAL
MONOCYTES # BLD AUTO: 0.7 10E3/UL (ref 0–1.3)
MONOCYTES NFR BLD AUTO: 15 %
NEUTROPHILS # BLD AUTO: 2.2 10E3/UL (ref 1.6–8.3)
NEUTROPHILS NFR BLD AUTO: 53 %
NITRITE UR QL STRIP: ABNORMAL
NRBC # BLD AUTO: 0 10E3/UL
NRBC BLD AUTO-RTO: 0 /100
PH UR STRIP: 6.5 PH (ref 5–7)
PLATELET # BLD AUTO: 180 10E3/UL (ref 150–450)
POTASSIUM SERPL-SCNC: 3.6 MMOL/L (ref 3.4–5.3)
PROT SERPL-MCNC: 7.2 G/DL (ref 6.4–8.3)
PROT UR QL: ABNORMAL MG/DL
RBC # BLD AUTO: 3.53 10E6/UL (ref 3.8–5.2)
RBC, UR MICRO: ABNORMAL (ref ?–2)
SODIUM SERPL-SCNC: 123 MMOL/L (ref 135–145)
SP GR UR STRIP: 1.01
UROBILINOGEN UR QL STRIP: 0.2 EU/DL (ref 0.2–1)
WBC # BLD AUTO: 4.2 10E3/UL (ref 4–11)
WBC #/AREA URNS HPF: ABNORMAL /[HPF]
WBC, UR MICRO: ABNORMAL (ref ?–2)

## 2025-05-22 PROCEDURE — 85014 HEMATOCRIT: CPT | Performed by: EMERGENCY MEDICINE

## 2025-05-22 PROCEDURE — 250N000011 HC RX IP 250 OP 636: Mod: JZ | Performed by: PHYSICIAN ASSISTANT

## 2025-05-22 PROCEDURE — 82374 ASSAY BLOOD CARBON DIOXIDE: CPT | Performed by: PHYSICIAN ASSISTANT

## 2025-05-22 PROCEDURE — 99285 EMERGENCY DEPT VISIT HI MDM: CPT | Mod: 25

## 2025-05-22 PROCEDURE — 82077 ASSAY SPEC XCP UR&BREATH IA: CPT | Performed by: PHYSICIAN ASSISTANT

## 2025-05-22 PROCEDURE — 250N000009 HC RX 250: Performed by: PHYSICIAN ASSISTANT

## 2025-05-22 PROCEDURE — 258N000003 HC RX IP 258 OP 636: Performed by: EMERGENCY MEDICINE

## 2025-05-22 PROCEDURE — 85025 COMPLETE CBC W/AUTO DIFF WBC: CPT | Performed by: PHYSICIAN ASSISTANT

## 2025-05-22 PROCEDURE — 250N000011 HC RX IP 250 OP 636: Performed by: PHYSICIAN ASSISTANT

## 2025-05-22 PROCEDURE — 82247 BILIRUBIN TOTAL: CPT | Performed by: PHYSICIAN ASSISTANT

## 2025-05-22 PROCEDURE — 36415 COLL VENOUS BLD VENIPUNCTURE: CPT | Performed by: EMERGENCY MEDICINE

## 2025-05-22 PROCEDURE — 96361 HYDRATE IV INFUSION ADD-ON: CPT

## 2025-05-22 PROCEDURE — 74177 CT ABD & PELVIS W/CONTRAST: CPT

## 2025-05-22 PROCEDURE — 96374 THER/PROPH/DIAG INJ IV PUSH: CPT | Mod: 59

## 2025-05-22 RX ORDER — ESCITALOPRAM OXALATE 20 MG/1
20 TABLET ORAL DAILY
COMMUNITY

## 2025-05-22 RX ORDER — IOPAMIDOL 755 MG/ML
500 INJECTION, SOLUTION INTRAVASCULAR ONCE
Status: COMPLETED | OUTPATIENT
Start: 2025-05-22 | End: 2025-05-22

## 2025-05-22 RX ORDER — KETOROLAC TROMETHAMINE 15 MG/ML
15 INJECTION, SOLUTION INTRAMUSCULAR; INTRAVENOUS ONCE
Status: COMPLETED | OUTPATIENT
Start: 2025-05-22 | End: 2025-05-22

## 2025-05-22 RX ADMIN — KETOROLAC TROMETHAMINE 15 MG: 15 INJECTION, SOLUTION INTRAMUSCULAR; INTRAVENOUS at 17:10

## 2025-05-22 RX ADMIN — SODIUM CHLORIDE 57 ML: 9 INJECTION, SOLUTION INTRAVENOUS at 16:11

## 2025-05-22 RX ADMIN — IOPAMIDOL 66 ML: 755 INJECTION, SOLUTION INTRAVENOUS at 16:11

## 2025-05-22 RX ADMIN — SODIUM CHLORIDE 1000 ML: 0.9 INJECTION, SOLUTION INTRAVENOUS at 17:15

## 2025-05-22 ASSESSMENT — COLUMBIA-SUICIDE SEVERITY RATING SCALE - C-SSRS
1. IN THE PAST MONTH, HAVE YOU WISHED YOU WERE DEAD OR WISHED YOU COULD GO TO SLEEP AND NOT WAKE UP?: NO
2. HAVE YOU ACTUALLY HAD ANY THOUGHTS OF KILLING YOURSELF IN THE PAST MONTH?: NO
6. HAVE YOU EVER DONE ANYTHING, STARTED TO DO ANYTHING, OR PREPARED TO DO ANYTHING TO END YOUR LIFE?: NO

## 2025-05-22 ASSESSMENT — ACTIVITIES OF DAILY LIVING (ADL)
ADLS_ACUITY_SCORE: 41

## 2025-05-22 NOTE — PROGRESS NOTES
Assessment & Plan     Flank pain-suspect renal stone, will get urgent CT to characterize  If stone, will Rx flomax and help with pain control  Pt scheduled for 1st available CT 5/23 0800 with [facility]  ED for severe flank pain, worsening fevers  - URINALYSIS, ROUTINE (BFP)  - CT Abdomen Pelvis w/o Contrast  - Radiology Referral (Affiliate Use Only)        Follow up as needed    Subjective   Susie is a 54 year old, presenting for the following health issues:  Consult (Thinks she may have passed a kidney stone on Saturday - has been having left side flank pain. Some fever, no appetite, can't sleep due to pain that wakes her up. )    HPI      Pt presents with L flank pain for last 5 days. Thinks she passed a stone a few days ago. No history of kidney stones in the past. Since this, ongoing flank pain, low appetite, fever (took Advil this AM). Pain can move around her L flank. No urinary symptoms, no vaginal discharge. Hasn't ever had anything like this before. No other changes to her health.     Review of Systems  Constitutional, neuro, ENT, endocrine, pulmonary, cardiac, gastrointestinal, genitourinary, musculoskeletal, integument and psychiatric systems are negative, except as otherwise noted.      Objective    /82 (BP Location: Right arm, Patient Position: Sitting, Cuff Size: Adult Large)   Pulse 93   Temp 97.3  F (36.3  C) (Temporal)   Wt 59.5 kg (131 lb 3.2 oz)   SpO2 98%   BMI 21.83 kg/m    Body mass index is 21.83 kg/m .  Physical Exam   GENERAL: alert and no distress  NECK: no adenopathy, no asymmetry, masses, or scars  RESP: lungs clear to auscultation - no rales, rhonchi or wheezes  CV: regular rate and rhythm, normal S1 S2, no S3 or S4, no murmur, click or rub, no peripheral edema  ABDOMEN: soft, nontender, no hepatosplenomegaly, no masses and bowel sounds normal  MS: no gross musculoskeletal defects noted, no edema  BACK: L CVA tenderness on palpation    Results for orders placed or  performed in visit on 05/22/25 (from the past 24 hours)   URINALYSIS, ROUTINE (BFP)   Result Value Ref Range    Color Urine Yellow     Appearance Urine Slightly Cloudy (A)     Glucose Urine Neg mg/dL    Bilirubin Urine Neg     Ketones Urine Neg mg/dL    Specific Gravity Urine 1.010     Blood Urine Neg     pH Urine 6.5 pH    Protein Urine neg neg - neg mg/dL    Urobilinogen Urine 0.2 EU/dL    Nitrite Urine Neg     Leukocytes neg     Wbc, Urine Micro 0-2 neg - 2    RBC Micro Urine 0-2 neg - 2    EP/HPF FEW     Bacteria Urine SMALL (A) neg - neg    Casts/LPF neg     Miscellaneous few amorphous crystals (A)            Signed Electronically by: Saurav Salinas PA-C

## 2025-05-22 NOTE — ED TRIAGE NOTES
Pt arrives for L sided flank pain, went to clinic and was told her insurance wouldn't cover a CT scan there so came here for it. Pt reports subjective fever/chills. Denies hx of kidney stone. UA done at clinic can be seen in results review. AVSS on RA.

## 2025-05-22 NOTE — ED NOTES
Pt ok for discharge with sober ride per MD and PA. Pt stated her  is here to pick her up and waiting outside the door. She said he would not come inside but he is here. This RN told patient she would walk out to see that she had a sober ride and once we reached the lobby the patient walked straight out the front door. Pt advised to stop and did not listen or turn around. No sober ride in sight. Spoke with security and they stated they were unable to stop patient once they left the property.

## 2025-05-22 NOTE — DISCHARGE INSTRUCTIONS
Your sodium is low and you will need a recheck with primary care within 1-2 days. Discuss alcohol use with primary care. Return to ED with any worsening symptoms such as fevers, vomiting or worsening pain.

## 2025-05-22 NOTE — NURSING NOTE
Chief Complaint   Patient presents with    Consult     Thinks she may have passed a kidney stone on Saturday - has been having left side flank pain. Some fever, no appetite, can't sleep due to pain that wakes her up.      Pre-visit Screening:  Immunizations:  not up to date - na  Colonoscopy:  up to date  Mammogram: is up to date  Asthma Action Test/Plan:  na  PHQ9:  na  GAD7:  na  Questioned patient about current smoking habits Pt. quit smoking cigarettes some time ago, vapes everyday.  Ok to leave detailed message on voice mail for today's visit only yes, phone # [phone] (home)

## 2025-05-22 NOTE — ED PROVIDER NOTES
ED APC SUPERVISION NOTE:   I evaluated this patient in conjunction with Mona Oshea PA-C I have participated in the care of the patient and personally performed key elements of the history, exam, and medical decision making.      HPI:   Susie Santana is a 54 year old female with history of alcohol use disorder, hypertension who presents emergency department for evaluation of flank pain for the past 5 days.  Denies any trauma or falls.  Was concerned about kidney stone.  No history of alcohol withdrawal seizures.  No shortness of breath or vomiting or chest pain or black or bloody stools or fever      Review of External Notes: Medicine note reviewed from earlier today, patient seen for flank pain, advised to come to ED for possible CT     EXAM:   GENERAL: Awake, alert  LUNGS: Breathing comfortably on room air  MSK: Midline spinal tenderness, tender to palpation left flank  ABDOMEN: Soft, nontender, no rebound or guarding  EXTREMITIES: No peripheral edema  NEURO: 5 out of 5 strength in bilateral hip, knee, and ankle flexion and extension        MEDICAL DECISION MAKING/ASSESSMENT AND PLAN: 54-year-old female who presents for left flank pain.  Urinalysis was reviewed from earlier today, this shows no evidence of UTI.  Patient CT showed distended bladder but she is still able to urinate.  She has normal renal function here.  Alcohol level was elevated at 0.32.  She is clinically sober at this point and is able to ambulate.  She has no leukocytosis.  Sodium was low at 123 and AST and ALT were elevated, likely secondary to alcohol abuse.  We recommended IV fluids, recheck of BMP, to make sure that the sodium is coming up, possible admission given hyponatremia.  Patient adamantly wishes to go home, she is clinically sober, she was given some IV fluids here, will follow-up with PCP to get sodium rechecked.  I suspect that this is all secondary to dehydration given alcohol abuse/beer potomania but will return if  worsening.  No midline spinal tenderness or trauma, patient has normal strength in lower extremities, nothing to suspect cauda equina syndrome, bladder looks distended on CT but patient is still able to urinate can follow-up with PCP and urology given postvoid residual of 650     DIAGNOSIS:     ICD-10-CM    1. Hyponatremia  E87.1       2. Left flank pain  R10.9       3. Alcoholic intoxication without complication  F10.920       4. Elevated liver enzymes  R74.8       5. Urinary retention  R33.9 Adult Urology [facility] Referral                 Mer Bhardwaj MD  05/22/25 2052

## 2025-05-22 NOTE — ED PROVIDER NOTES
"  Emergency Department Note      History of Present Illness     Chief Complaint   Flank Pain      HPI   Susie Santana is a 54 year old female with daily alcohol use, hypertension who presents with left flank pain. Patient reports left flank pain for past 5 days. She also has chills but no recorded fever. She presented to outside clinic and there was concern for a kidney stone. She reports her insurance would not cover an outpatient CT and so presents for evaluation. She denies chest pain, shortness of breath, vomiting, dysuria, hematuria, or dark or bloody stools. Denies prior heart or lung disease. Denies prior PE/DVT and denies recent surgeries, immobilization, hemoptysis, or leg swelling. She reports daily alcohol consumption stating she has a couple drinks every night. Denies alcohol withdrawal or seizures. Denies recent medication changes. Denies falls or head trauma.    Independent Historian   None    Review of External Notes   None    Past Medical History     Medical History and Problem List   Past Medical History:   Diagnosis Date    Family history of malignant neoplasm of gastrointestinal tract        Medications   ALPRAZolam (XANAX) 0.25 MG tablet  desoximetasone (TOPICORT) 0.25 % external cream  escitalopram (LEXAPRO) 5 MG tablet  losartan (COZAAR) 25 MG tablet        Surgical History   Past Surgical History:   Procedure Laterality Date    ENDOMETRIAL ABLATION      FINGER SURGERY Right     right index finger    FOOT SURGERY Right     benign rheumatoid nodules removed    HC TOOTH EXTRACTION W/FORCEP      wisdom teeth    SKIN CANCER EXCISION      multiple basal cells removed    ZZC LIGATE FALLOPIAN TUBE,POSTPARTUM  01/01/2005    Tubal Ligation       Physical Exam     Patient Vitals for the past 24 hrs:   BP Temp Temp src Pulse Resp SpO2 Height Weight   05/22/25 1403 (!) 154/104 98.3  F (36.8  C) Temporal 93 18 100 % 1.651 m (5' 5\") 60.3 kg (132 lb 15 oz)     Physical Exam  Constitutional: Alert, " attentive, GCS 15  HENT:    Nose: Nose normal.    Mouth/Throat: Oropharynx is clear, mucous membranes are moist  Eyes:  EOM are normal. Pupils equal and reactive.  CV:  regular rate and rhythm; no murmurs, rubs or gallups  Chest: Effort normal and breath sounds normal.   GI:   Epigastrium - no tenderness, no guarding   RUQ - no tenderness or Vargas's sign   RLQ - No tenderness, no guarding, no Rovsing's sign   Suprapubic area - no tenderness, no guarding    LLQ - no tenderness, no guarding   LUQ - no tenderness, no guarding   No distension. Normal bowel sounds  : Low left flank pain. No CVA tenderness.  MSK: Normal range of motion.   Neurological: Alert, attentive  Skin: Skin is warm and dry. No dermatomal vesicular rash.       Diagnostics     Lab Results   Labs Ordered and Resulted from Time of ED Arrival to Time of ED Departure   BASIC METABOLIC PANEL - Abnormal       Result Value    Sodium 123 (*)     Potassium 3.6      Chloride 82 (*)     Carbon Dioxide (CO2) 26      Anion Gap 15      Urea Nitrogen 3.0 (*)     Creatinine 0.47 (*)     GFR Estimate >90      Calcium 9.0      Glucose 93     CBC WITH PLATELETS AND DIFFERENTIAL - Abnormal    WBC Count 4.2      RBC Count 3.53 (*)     Hemoglobin 12.0      Hematocrit 32.6 (*)     MCV 92      MCH 34.0 (*)     MCHC 36.8 (*)     RDW 12.2      Platelet Count 180      % Neutrophils 53      % Lymphocytes 30      % Monocytes 15      % Eosinophils 1      % Basophils 1      % Immature Granulocytes 1      NRBCs per 100 WBC 0      Absolute Neutrophils 2.2      Absolute Lymphocytes 1.3      Absolute Monocytes 0.7      Absolute Eosinophils 0.1      Absolute Basophils 0.0      Absolute Immature Granulocytes 0.0      Absolute NRBCs 0.0     HEPATIC FUNCTION PANEL - Abnormal    Protein Total 7.2      Albumin 4.6      Bilirubin Total 0.3      Alkaline Phosphatase 127       (*)      (*)     Bilirubin Direct 0.17     ETHANOL LEVEL BLOOD - Abnormal    Ethanol Level Blood 0.32  (*)        Imaging   CT Abdomen Pelvis w Contrast   Final Result   IMPRESSION:   1.  The bladder is distended. Consider catheter placement. No ureteral calculi. No hydronephrosis or hydroureter.   2.  Hepatic steatosis.   3.  An appendicolith is present. The appendix is normal in caliber. There is no periappendiceal stranding or fluid.             EKG   None    Independent Interpretation   None    ED Course      Medications Administered   Medications   iopamidol (ISOVUE-370) solution 500 mL (66 mLs Intravenous $Given 5/22/25 1611)   sodium chloride 0.9 % bag for CT scan flush (57 mLs Intravenous $Given 5/22/25 1611)   ketorolac (TORADOL) injection 15 mg (15 mg Intravenous $Given 5/22/25 1710)   sodium chloride 0.9% BOLUS 1,000 mL (0 mLs Intravenous Stopped 5/22/25 1746)       Procedures   Procedures     Discussion of Management   None    ED Course        Additional Documentation  None    Medical Decision Making / Diagnosis     CMS Diagnoses: None    MIPS   None           MDM   Susie Santana is a 54 year old female with daily alcohol use  who presents with left flank pain for past 5 days.  She is afebrile, hypertensive at 154/104, not hypoxic.  Physical examination reveals reproducible low left flank pain.  Differential includes nephrolithiasis, pyelonephritis, PE, back strain.  Labs are significant for hyponatremia at 123. LFTs also elevated consistent with alcohol use and alcohol level of 0.32.  IV fluids are started and she appears clinically sober as she able to ambulate and speak in clear sentences.  CT scan of the abdomen reveals no evidence of kidney stones, pyelonephritis, perinephric abscess. There is evidence of urinary retention with enlarged bladder. No hydronephrosis.  I spoke with the patient about findings. She is able to urine with postvoid residual is 650 mL.  She reports no abdominal or suprapubic tenderness. I offered catheter placement however patient declines. Discussed hospitalization for  hyponatremia management however patient is adamant about not coming into hospital. Suspect hyponatremia secondary to poor diet and alcohol consumption. There is no evidence of alcohol withdrawal at this time. She refuses repeat lab draw today and states her spouse will be picking her up for the ED. She will need close follow-up with primary care for lab recheck in 1-2 days. Urology referral placed to discuss urinary retention. Discussed return precautions and she is discharged.  Staffed with Dr. Gruber who evaluated patient and agrees with plan.    Disposition   The patient was discharged.     Diagnosis     ICD-10-CM    1. Hyponatremia  E87.1       2. Left flank pain  R10.9       3. Alcoholic intoxication without complication  F10.920       4. Elevated liver enzymes  R74.8       5. Urinary retention  R33.9 Adult Urology  Referral           Discharge Medications   Discharge Medication List as of 5/22/2025  5:46 PM          3:31 PM  May 22, 2025  KALIEY PASTRANA Emily, PA-C  05/22/25 1819       Mona Oshea PA-C  05/22/25 1819

## 2025-05-26 ENCOUNTER — PATIENT OUTREACH (OUTPATIENT)
Dept: CARE COORDINATION | Facility: CLINIC | Age: 55
End: 2025-05-26
Payer: COMMERCIAL

## 2025-07-28 ENCOUNTER — OFFICE VISIT (OUTPATIENT)
Dept: FAMILY MEDICINE | Facility: CLINIC | Age: 55
End: 2025-07-28

## 2025-07-28 VITALS
HEART RATE: 90 BPM | WEIGHT: 126 LBS | OXYGEN SATURATION: 99 % | BODY MASS INDEX: 20.97 KG/M2 | DIASTOLIC BLOOD PRESSURE: 102 MMHG | TEMPERATURE: 97.2 F | SYSTOLIC BLOOD PRESSURE: 150 MMHG

## 2025-07-28 DIAGNOSIS — R10.9 LEFT FLANK PAIN: Primary | ICD-10-CM

## 2025-07-28 LAB
APPEARANCE UR: CLEAR
BACTERIA, UR: ABNORMAL
BILIRUB UR QL: ABNORMAL
CASTS/LPF: ABNORMAL
COLOR UR: YELLOW
EP/HPF: ABNORMAL
GLUCOSE URINE: ABNORMAL MG/DL
HGB UR QL: ABNORMAL
KETONES UR QL: ABNORMAL MG/DL
MISC.: ABNORMAL
NITRITE UR QL STRIP: ABNORMAL
PH UR STRIP: 7 PH (ref 5–7)
PROT UR QL: ABNORMAL MG/DL
RBC, UR MICRO: ABNORMAL (ref ?–2)
SP GR UR STRIP: 1.01
UROBILINOGEN UR QL STRIP: 0.2 EU/DL (ref 0.2–1)
WBC #/AREA URNS HPF: ABNORMAL /[HPF]
WBC, UR MICRO: ABNORMAL (ref ?–2)

## 2025-07-28 NOTE — NURSING NOTE
Chief Complaint   Patient presents with    Consult     Consult on possible kidney infection - a lot of pain in her L side, fever won't go away with advil, shakiness and insomnia      Pre-visit Screening:  Immunizations:  not up to date - declined  Colonoscopy:  is up to date  Mammogram: is up to date  Asthma Action Test/Plan:  na  PHQ9:  na  GAD7:  na  Questioned patient about current smoking habits Pt. vapes  Ok to leave detailed message on voice mail for today's visit only yes, phone # [phone] (home)

## 2025-07-28 NOTE — PROGRESS NOTES
Assessment & Plan     Left flank pain-culture pending, but low concern for UTI given relatively clean UA. I wanted to get blood work today including CBC and CMP, she declined, stating she wants to get lab work at upcoming physical exam. I am suspicious for high daily alcohol intake causing her symptoms-discussed this with patient, she states she doesn't need help to cut down or quit-declines referrals to outpatient treatment today. Knows she needs to cut down. Also considering lumbar spine issue causing her symptoms-discussed option for MRI lumbar spine, she declines for now. Consider this if symptoms ongoing.  SLOWLY cut down on alcohol intake  ED for worsening symptoms  - URINALYSIS, ROUTINE ([facility])  - URINE CULTURE AEROBIC BACTERIAL ([facility])    33 minutes spent with patient in outside chart review, history, chart completion excluding procedures on same day of appointment.      Follow up as needed    Subjective   Susie is a 54 year old, presenting for the following health issues:  Consult (Consult on possible kidney infection - a lot of pain in her L side, fever won't go away with advil, shakiness and insomnia )    HPI      Pt presents with ongoing L flank pain. I suspected a stone 5/22/25, seen in ED later that day, CT showed distended bladder, fatty liver, also noted BAC .32. No stones noted in kidneys MO. Provider in ED was suspicious for alcohol causing dehydration which was causing symptoms. Daily drinking behavior ongoing-knows she needs to cut back. Having a lot of pain now-on and off fevers (no documented temperatures, but having fevers/chills), poor sleep, some shaking on and off. Feels very similar to a kidney infection she had in the past. No urinary burning, urgency, frequency. Ongoing since last visit here at [facility]. Drinking 3-4 [place] [employer] per night, pt admits. Does have worse pain with bending over, better with movement. No leg pain, weakness, tingling, change in sensation in upper or lower  extremities.      Review of Systems  Constitutional, neuro, ENT, endocrine, pulmonary, cardiac, gastrointestinal, genitourinary, musculoskeletal, integument and psychiatric systems are negative, except as otherwise noted.      Objective    BP (!) 150/102 (BP Location: Right arm, Patient Position: Sitting, Cuff Size: Adult Regular)   Pulse 90   Temp 97.2  F (36.2  C) (Temporal)   Wt 57.2 kg (126 lb)   SpO2 99%   BMI 20.97 kg/m    Body mass index is 20.97 kg/m .  Physical Exam   GENERAL: alert and no distress  NECK: no adenopathy, no asymmetry, masses, or scars  RESP: lungs clear to auscultation - no rales, rhonchi or wheezes  CV: regular rate and rhythm, normal S1 S2, no S3 or S4, no murmur, click or rub, no peripheral edema  ABDOMEN: soft, nontender, no hepatosplenomegaly, no masses and bowel sounds normal  MS: no gross musculoskeletal defects noted, no edema  NEURO: Normal strength and tone, mentation intact and speech normal  BACK: no CVA tenderness, no paralumbar tenderness  PSYCH: mentation appears normal, affect normal/bright    Recent Results (from the past 24 hours)   URINALYSIS, ROUTINE ([facility])   Result Value Ref Range    Color Urine Yellow     Appearance Urine Clear     Glucose Urine Neg mg/dL    Bilirubin Urine Neg     Ketones Urine Neg mg/dL    Specific Gravity Urine 1.010     Blood Urine Neg     pH Urine 7.0 pH    Protein Urine NEG neg - neg mg/dL    Urobilinogen Urine 0.2 EU/dL    Nitrite Urine Neg     Leukocytes neg     Wbc, Urine Micro 0-2 neg - 2    RBC Micro Urine 0-2 neg - 2    EP/HPF OCC     Bacteria Urine FEW (A) neg - neg    Casts/LPF neg     Miscellaneous neg            Signed Electronically by: Saurav Salinas PA-C

## 2025-07-30 LAB — URINE VOIDED CULTURE: NORMAL

## 2025-08-16 ENCOUNTER — HEALTH MAINTENANCE LETTER (OUTPATIENT)
Age: 55
End: 2025-08-16